# Patient Record
Sex: FEMALE | Race: WHITE | HISPANIC OR LATINO | Employment: OTHER | URBAN - METROPOLITAN AREA
[De-identification: names, ages, dates, MRNs, and addresses within clinical notes are randomized per-mention and may not be internally consistent; named-entity substitution may affect disease eponyms.]

---

## 2022-11-13 ENCOUNTER — HOSPITAL ENCOUNTER (INPATIENT)
Facility: MEDICAL CENTER | Age: 37
LOS: 3 days | DRG: 872 | End: 2022-11-17
Attending: EMERGENCY MEDICINE | Admitting: INTERNAL MEDICINE

## 2022-11-13 ENCOUNTER — OFFICE VISIT (OUTPATIENT)
Dept: URGENT CARE | Facility: PHYSICIAN GROUP | Age: 37
End: 2022-11-13

## 2022-11-13 ENCOUNTER — APPOINTMENT (OUTPATIENT)
Dept: RADIOLOGY | Facility: MEDICAL CENTER | Age: 37
DRG: 872 | End: 2022-11-13
Attending: EMERGENCY MEDICINE

## 2022-11-13 VITALS
OXYGEN SATURATION: 95 % | HEART RATE: 140 BPM | WEIGHT: 154 LBS | BODY MASS INDEX: 28.17 KG/M2 | TEMPERATURE: 103.4 F | DIASTOLIC BLOOD PRESSURE: 82 MMHG | RESPIRATION RATE: 16 BRPM | SYSTOLIC BLOOD PRESSURE: 130 MMHG

## 2022-11-13 DIAGNOSIS — R78.81 BACTEREMIA: ICD-10-CM

## 2022-11-13 DIAGNOSIS — N12 PYELONEPHRITIS: ICD-10-CM

## 2022-11-13 DIAGNOSIS — A41.9 SEPSIS, DUE TO UNSPECIFIED ORGANISM, UNSPECIFIED WHETHER ACUTE ORGAN DYSFUNCTION PRESENT (HCC): ICD-10-CM

## 2022-11-13 DIAGNOSIS — R50.9 FEVER IN ADULT: ICD-10-CM

## 2022-11-13 DIAGNOSIS — N10 ACUTE PYELONEPHRITIS: ICD-10-CM

## 2022-11-13 PROBLEM — R74.8 ELEVATED LIVER ENZYMES: Status: ACTIVE | Noted: 2022-11-13

## 2022-11-13 PROBLEM — D72.829 LEUCOCYTOSIS: Status: ACTIVE | Noted: 2022-11-13

## 2022-11-13 LAB
ALBUMIN SERPL BCP-MCNC: 4.5 G/DL (ref 3.2–4.9)
ALBUMIN/GLOB SERPL: 1.3 G/DL
ALP SERPL-CCNC: 166 U/L (ref 30–99)
ALT SERPL-CCNC: 89 U/L (ref 2–50)
ANION GAP SERPL CALC-SCNC: 13 MMOL/L (ref 7–16)
APPEARANCE UR: CLEAR
APPEARANCE UR: NORMAL
AST SERPL-CCNC: 48 U/L (ref 12–45)
BACTERIA #/AREA URNS HPF: ABNORMAL /HPF
BASOPHILS # BLD AUTO: 0.3 % (ref 0–1.8)
BASOPHILS # BLD: 0.04 K/UL (ref 0–0.12)
BILIRUB SERPL-MCNC: 0.4 MG/DL (ref 0.1–1.5)
BILIRUB UR QL STRIP.AUTO: NEGATIVE
BILIRUB UR STRIP-MCNC: NEGATIVE MG/DL
BUN SERPL-MCNC: 10 MG/DL (ref 8–22)
CALCIUM SERPL-MCNC: 9.3 MG/DL (ref 8.5–10.5)
CHLORIDE SERPL-SCNC: 100 MMOL/L (ref 96–112)
CO2 SERPL-SCNC: 21 MMOL/L (ref 20–33)
COLOR UR AUTO: YELLOW
COLOR UR: YELLOW
CREAT SERPL-MCNC: 0.62 MG/DL (ref 0.5–1.4)
EOSINOPHIL # BLD AUTO: 0 K/UL (ref 0–0.51)
EOSINOPHIL NFR BLD: 0 % (ref 0–6.9)
EPI CELLS #/AREA URNS HPF: ABNORMAL /HPF
ERYTHROCYTE [DISTWIDTH] IN BLOOD BY AUTOMATED COUNT: 39.9 FL (ref 35.9–50)
FLUAV RNA SPEC QL NAA+PROBE: NEGATIVE
FLUBV RNA SPEC QL NAA+PROBE: NEGATIVE
GFR SERPLBLD CREATININE-BSD FMLA CKD-EPI: 116 ML/MIN/1.73 M 2
GLOBULIN SER CALC-MCNC: 3.6 G/DL (ref 1.9–3.5)
GLUCOSE SERPL-MCNC: 126 MG/DL (ref 65–99)
GLUCOSE UR STRIP.AUTO-MCNC: NEGATIVE MG/DL
GLUCOSE UR STRIP.AUTO-MCNC: NEGATIVE MG/DL
HCG SERPL QL: NEGATIVE
HCT VFR BLD AUTO: 41.2 % (ref 37–47)
HGB BLD-MCNC: 13.6 G/DL (ref 12–16)
HYALINE CASTS #/AREA URNS LPF: ABNORMAL /LPF
IMM GRANULOCYTES # BLD AUTO: 0.04 K/UL (ref 0–0.11)
IMM GRANULOCYTES NFR BLD AUTO: 0.3 % (ref 0–0.9)
INT CON NEG: NEGATIVE
INT CON POS: POSITIVE
KETONES UR STRIP.AUTO-MCNC: 40 MG/DL
KETONES UR STRIP.AUTO-MCNC: 40 MG/DL
LACTATE SERPL-SCNC: 1.3 MMOL/L (ref 0.5–2)
LEUKOCYTE ESTERASE UR QL STRIP.AUTO: ABNORMAL
LEUKOCYTE ESTERASE UR QL STRIP.AUTO: NORMAL
LIPASE SERPL-CCNC: 24 U/L (ref 11–82)
LYMPHOCYTES # BLD AUTO: 0.91 K/UL (ref 1–4.8)
LYMPHOCYTES NFR BLD: 7.5 % (ref 22–41)
MCH RBC QN AUTO: 27 PG (ref 27–33)
MCHC RBC AUTO-ENTMCNC: 33 G/DL (ref 33.6–35)
MCV RBC AUTO: 81.9 FL (ref 81.4–97.8)
MICRO URNS: ABNORMAL
MONOCYTES # BLD AUTO: 0.89 K/UL (ref 0–0.85)
MONOCYTES NFR BLD AUTO: 7.4 % (ref 0–13.4)
NEUTROPHILS # BLD AUTO: 10.22 K/UL (ref 2–7.15)
NEUTROPHILS NFR BLD: 84.5 % (ref 44–72)
NITRITE UR QL STRIP.AUTO: NEGATIVE
NITRITE UR QL STRIP.AUTO: POSITIVE
NRBC # BLD AUTO: 0 K/UL
NRBC BLD-RTO: 0 /100 WBC
PH UR STRIP.AUTO: 5.5 [PH] (ref 5–8)
PH UR STRIP.AUTO: 6 [PH] (ref 5–8)
PLATELET # BLD AUTO: 254 K/UL (ref 164–446)
PMV BLD AUTO: 8.6 FL (ref 9–12.9)
POC URINE PREGNANCY TEST: NEGATIVE
POTASSIUM SERPL-SCNC: 3.5 MMOL/L (ref 3.6–5.5)
PROT SERPL-MCNC: 8.1 G/DL (ref 6–8.2)
PROT UR QL STRIP: NEGATIVE MG/DL
PROT UR QL STRIP: NEGATIVE MG/DL
RBC # BLD AUTO: 5.03 M/UL (ref 4.2–5.4)
RBC # URNS HPF: ABNORMAL /HPF
RBC UR QL AUTO: ABNORMAL
RBC UR QL AUTO: NORMAL
RSV RNA SPEC QL NAA+PROBE: NEGATIVE
SARS-COV-2 RNA RESP QL NAA+PROBE: NOTDETECTED
SODIUM SERPL-SCNC: 134 MMOL/L (ref 135–145)
SP GR UR STRIP.AUTO: 1.01
SP GR UR STRIP.AUTO: 1.02
SPECIMEN SOURCE: NORMAL
UROBILINOGEN UR STRIP-MCNC: 1 MG/DL
UROBILINOGEN UR STRIP.AUTO-MCNC: 1 MG/DL
WBC # BLD AUTO: 12.1 K/UL (ref 4.8–10.8)
WBC #/AREA URNS HPF: ABNORMAL /HPF

## 2022-11-13 PROCEDURE — 700105 HCHG RX REV CODE 258: Performed by: INTERNAL MEDICINE

## 2022-11-13 PROCEDURE — 84703 CHORIONIC GONADOTROPIN ASSAY: CPT

## 2022-11-13 PROCEDURE — 83605 ASSAY OF LACTIC ACID: CPT

## 2022-11-13 PROCEDURE — C9803 HOPD COVID-19 SPEC COLLECT: HCPCS | Performed by: EMERGENCY MEDICINE

## 2022-11-13 PROCEDURE — 700102 HCHG RX REV CODE 250 W/ 637 OVERRIDE(OP): Performed by: INTERNAL MEDICINE

## 2022-11-13 PROCEDURE — 83690 ASSAY OF LIPASE: CPT

## 2022-11-13 PROCEDURE — A9270 NON-COVERED ITEM OR SERVICE: HCPCS | Performed by: INTERNAL MEDICINE

## 2022-11-13 PROCEDURE — 99203 OFFICE O/P NEW LOW 30 MIN: CPT | Performed by: NURSE PRACTITIONER

## 2022-11-13 PROCEDURE — 0241U HCHG SARS-COV-2 COVID-19 NFCT DS RESP RNA 4 TRGT MIC: CPT

## 2022-11-13 PROCEDURE — 87077 CULTURE AEROBIC IDENTIFY: CPT

## 2022-11-13 PROCEDURE — 99220 PR INITIAL OBSERVATION CARE,LEVL III: CPT | Performed by: INTERNAL MEDICINE

## 2022-11-13 PROCEDURE — 87186 SC STD MICRODIL/AGAR DIL: CPT

## 2022-11-13 PROCEDURE — 99285 EMERGENCY DEPT VISIT HI MDM: CPT

## 2022-11-13 PROCEDURE — 36415 COLL VENOUS BLD VENIPUNCTURE: CPT

## 2022-11-13 PROCEDURE — 700117 HCHG RX CONTRAST REV CODE 255: Performed by: EMERGENCY MEDICINE

## 2022-11-13 PROCEDURE — 96374 THER/PROPH/DIAG INJ IV PUSH: CPT

## 2022-11-13 PROCEDURE — G0378 HOSPITAL OBSERVATION PER HR: HCPCS

## 2022-11-13 PROCEDURE — 700105 HCHG RX REV CODE 258: Performed by: EMERGENCY MEDICINE

## 2022-11-13 PROCEDURE — 87040 BLOOD CULTURE FOR BACTERIA: CPT

## 2022-11-13 PROCEDURE — 81025 URINE PREGNANCY TEST: CPT | Performed by: NURSE PRACTITIONER

## 2022-11-13 PROCEDURE — 700111 HCHG RX REV CODE 636 W/ 250 OVERRIDE (IP): Performed by: INTERNAL MEDICINE

## 2022-11-13 PROCEDURE — 80053 COMPREHEN METABOLIC PANEL: CPT

## 2022-11-13 PROCEDURE — 85025 COMPLETE CBC W/AUTO DIFF WBC: CPT

## 2022-11-13 PROCEDURE — 99999 PR NO CHARGE: CPT | Performed by: NURSE PRACTITIONER

## 2022-11-13 PROCEDURE — 700111 HCHG RX REV CODE 636 W/ 250 OVERRIDE (IP): Performed by: EMERGENCY MEDICINE

## 2022-11-13 PROCEDURE — 81002 URINALYSIS NONAUTO W/O SCOPE: CPT | Performed by: NURSE PRACTITIONER

## 2022-11-13 PROCEDURE — 87086 URINE CULTURE/COLONY COUNT: CPT

## 2022-11-13 PROCEDURE — 96372 THER/PROPH/DIAG INJ SC/IM: CPT

## 2022-11-13 PROCEDURE — 74177 CT ABD & PELVIS W/CONTRAST: CPT

## 2022-11-13 PROCEDURE — 81001 URINALYSIS AUTO W/SCOPE: CPT

## 2022-11-13 RX ORDER — ENOXAPARIN SODIUM 100 MG/ML
40 INJECTION SUBCUTANEOUS DAILY
Status: DISCONTINUED | OUTPATIENT
Start: 2022-11-13 | End: 2022-11-17 | Stop reason: HOSPADM

## 2022-11-13 RX ORDER — POLYETHYLENE GLYCOL 3350 17 G/17G
1 POWDER, FOR SOLUTION ORAL
Status: DISCONTINUED | OUTPATIENT
Start: 2022-11-13 | End: 2022-11-17 | Stop reason: HOSPADM

## 2022-11-13 RX ORDER — PNV NO.95/FERROUS FUM/FOLIC AC 28MG-0.8MG
1 TABLET ORAL DAILY
COMMUNITY
End: 2023-02-15

## 2022-11-13 RX ORDER — BISACODYL 10 MG
10 SUPPOSITORY, RECTAL RECTAL
Status: DISCONTINUED | OUTPATIENT
Start: 2022-11-13 | End: 2022-11-17 | Stop reason: HOSPADM

## 2022-11-13 RX ORDER — ACETAMINOPHEN 500 MG
1000 TABLET ORAL ONCE
Status: COMPLETED | OUTPATIENT
Start: 2022-11-13 | End: 2022-11-13

## 2022-11-13 RX ORDER — PROCHLORPERAZINE EDISYLATE 5 MG/ML
5-10 INJECTION INTRAMUSCULAR; INTRAVENOUS EVERY 4 HOURS PRN
Status: DISCONTINUED | OUTPATIENT
Start: 2022-11-13 | End: 2022-11-17 | Stop reason: HOSPADM

## 2022-11-13 RX ORDER — LEVOTHYROXINE SODIUM 0.03 MG/1
25 TABLET ORAL
COMMUNITY
End: 2024-01-19 | Stop reason: DRUGHIGH

## 2022-11-13 RX ORDER — ONDANSETRON 2 MG/ML
4 INJECTION INTRAMUSCULAR; INTRAVENOUS EVERY 4 HOURS PRN
Status: DISCONTINUED | OUTPATIENT
Start: 2022-11-13 | End: 2022-11-17 | Stop reason: HOSPADM

## 2022-11-13 RX ORDER — OXYCODONE HYDROCHLORIDE 5 MG/1
2.5 TABLET ORAL
Status: DISCONTINUED | OUTPATIENT
Start: 2022-11-13 | End: 2022-11-17 | Stop reason: HOSPADM

## 2022-11-13 RX ORDER — MORPHINE SULFATE 4 MG/ML
2 INJECTION INTRAVENOUS
Status: DISCONTINUED | OUTPATIENT
Start: 2022-11-13 | End: 2022-11-17 | Stop reason: HOSPADM

## 2022-11-13 RX ORDER — SODIUM CHLORIDE, SODIUM LACTATE, POTASSIUM CHLORIDE, AND CALCIUM CHLORIDE .6; .31; .03; .02 G/100ML; G/100ML; G/100ML; G/100ML
30 INJECTION, SOLUTION INTRAVENOUS ONCE
Status: DISCONTINUED | OUTPATIENT
Start: 2022-11-13 | End: 2022-11-13

## 2022-11-13 RX ORDER — AMOXICILLIN 250 MG
2 CAPSULE ORAL 2 TIMES DAILY
Status: DISCONTINUED | OUTPATIENT
Start: 2022-11-13 | End: 2022-11-17 | Stop reason: HOSPADM

## 2022-11-13 RX ORDER — LEVOTHYROXINE SODIUM 0.03 MG/1
25 TABLET ORAL
Status: DISCONTINUED | OUTPATIENT
Start: 2022-11-14 | End: 2022-11-17 | Stop reason: HOSPADM

## 2022-11-13 RX ORDER — SODIUM CHLORIDE, SODIUM LACTATE, POTASSIUM CHLORIDE, AND CALCIUM CHLORIDE .6; .31; .03; .02 G/100ML; G/100ML; G/100ML; G/100ML
30 INJECTION, SOLUTION INTRAVENOUS ONCE
Status: COMPLETED | OUTPATIENT
Start: 2022-11-13 | End: 2022-11-13

## 2022-11-13 RX ORDER — PROMETHAZINE HYDROCHLORIDE 25 MG/1
12.5-25 SUPPOSITORY RECTAL EVERY 4 HOURS PRN
Status: DISCONTINUED | OUTPATIENT
Start: 2022-11-13 | End: 2022-11-17 | Stop reason: HOSPADM

## 2022-11-13 RX ORDER — OXYCODONE HYDROCHLORIDE 5 MG/1
5 TABLET ORAL
Status: DISCONTINUED | OUTPATIENT
Start: 2022-11-13 | End: 2022-11-17 | Stop reason: HOSPADM

## 2022-11-13 RX ORDER — PROMETHAZINE HYDROCHLORIDE 25 MG/1
12.5-25 TABLET ORAL EVERY 4 HOURS PRN
Status: DISCONTINUED | OUTPATIENT
Start: 2022-11-13 | End: 2022-11-17 | Stop reason: HOSPADM

## 2022-11-13 RX ORDER — CEFTRIAXONE 1 G/1
1000 INJECTION, POWDER, FOR SOLUTION INTRAMUSCULAR; INTRAVENOUS ONCE
Status: COMPLETED | OUTPATIENT
Start: 2022-11-13 | End: 2022-11-13

## 2022-11-13 RX ORDER — ACETAMINOPHEN 325 MG/1
650 TABLET ORAL EVERY 6 HOURS PRN
Status: DISCONTINUED | OUTPATIENT
Start: 2022-11-13 | End: 2022-11-17 | Stop reason: HOSPADM

## 2022-11-13 RX ORDER — ONDANSETRON 4 MG/1
4 TABLET, ORALLY DISINTEGRATING ORAL EVERY 4 HOURS PRN
Status: DISCONTINUED | OUTPATIENT
Start: 2022-11-13 | End: 2022-11-17 | Stop reason: HOSPADM

## 2022-11-13 RX ORDER — SODIUM CHLORIDE, SODIUM LACTATE, POTASSIUM CHLORIDE, CALCIUM CHLORIDE 600; 310; 30; 20 MG/100ML; MG/100ML; MG/100ML; MG/100ML
INJECTION, SOLUTION INTRAVENOUS CONTINUOUS
Status: DISCONTINUED | OUTPATIENT
Start: 2022-11-13 | End: 2022-11-15

## 2022-11-13 RX ADMIN — ACETAMINOPHEN 650 MG: 325 TABLET, FILM COATED ORAL at 21:46

## 2022-11-13 RX ADMIN — CEFTRIAXONE SODIUM 1000 MG: 1 INJECTION, POWDER, FOR SOLUTION INTRAMUSCULAR; INTRAVENOUS at 20:00

## 2022-11-13 RX ADMIN — SODIUM CHLORIDE, POTASSIUM CHLORIDE, SODIUM LACTATE AND CALCIUM CHLORIDE: 600; 310; 30; 20 INJECTION, SOLUTION INTRAVENOUS at 20:45

## 2022-11-13 RX ADMIN — ENOXAPARIN SODIUM 40 MG: 40 INJECTION SUBCUTANEOUS at 20:39

## 2022-11-13 RX ADMIN — Medication 1000 MG: at 16:18

## 2022-11-13 RX ADMIN — IOHEXOL 100 ML: 350 INJECTION, SOLUTION INTRAVENOUS at 18:42

## 2022-11-13 RX ADMIN — SODIUM CHLORIDE, POTASSIUM CHLORIDE, SODIUM LACTATE AND CALCIUM CHLORIDE 2166 ML: 600; 310; 30; 20 INJECTION, SOLUTION INTRAVENOUS at 17:57

## 2022-11-13 RX ADMIN — OXYCODONE 5 MG: 5 TABLET ORAL at 20:39

## 2022-11-13 ASSESSMENT — ENCOUNTER SYMPTOMS
EYE PAIN: 0
NECK PAIN: 0
FOCAL WEAKNESS: 0
CHILLS: 0
HEADACHES: 0
ORTHOPNEA: 0
NAUSEA: 0
CLAUDICATION: 0
HEARTBURN: 0
DIZZINESS: 0
EYE REDNESS: 0
WHEEZING: 0
PALPITATIONS: 0
INSOMNIA: 0
DEPRESSION: 0
SINUS PAIN: 0
WEIGHT LOSS: 0
DIARRHEA: 0
EYE DISCHARGE: 0
SORE THROAT: 0
FLANK PAIN: 1
SEIZURES: 0
BLURRED VISION: 0
MYALGIAS: 0
COUGH: 0
SHORTNESS OF BREATH: 0
NERVOUS/ANXIOUS: 0
CONSTIPATION: 0
STRIDOR: 0
SPUTUM PRODUCTION: 0
ABDOMINAL PAIN: 0
BACK PAIN: 0
BLOOD IN STOOL: 0
VOMITING: 0
FEVER: 0

## 2022-11-13 ASSESSMENT — FIBROSIS 4 INDEX: FIB4 SCORE: 0.76

## 2022-11-13 ASSESSMENT — PATIENT HEALTH QUESTIONNAIRE - PHQ9
1. LITTLE INTEREST OR PLEASURE IN DOING THINGS: NOT AT ALL
2. FEELING DOWN, DEPRESSED, IRRITABLE, OR HOPELESS: NOT AT ALL
SUM OF ALL RESPONSES TO PHQ9 QUESTIONS 1 AND 2: 0

## 2022-11-13 ASSESSMENT — LIFESTYLE VARIABLES
CONSUMPTION TOTAL: NEGATIVE
EVER HAD A DRINK FIRST THING IN THE MORNING TO STEADY YOUR NERVES TO GET RID OF A HANGOVER: NO
ON A TYPICAL DAY WHEN YOU DRINK ALCOHOL HOW MANY DRINKS DO YOU HAVE: 0
TOTAL SCORE: 0
HAVE PEOPLE ANNOYED YOU BY CRITICIZING YOUR DRINKING: NO
AVERAGE NUMBER OF DAYS PER WEEK YOU HAVE A DRINK CONTAINING ALCOHOL: 0
TOTAL SCORE: 0
ALCOHOL_USE: NO
HOW MANY TIMES IN THE PAST YEAR HAVE YOU HAD 5 OR MORE DRINKS IN A DAY: 0
EVER FELT BAD OR GUILTY ABOUT YOUR DRINKING: NO
TOTAL SCORE: 0
HAVE YOU EVER FELT YOU SHOULD CUT DOWN ON YOUR DRINKING: NO

## 2022-11-14 PROBLEM — R78.81 BACTEREMIA: Status: ACTIVE | Noted: 2022-11-14

## 2022-11-14 LAB
ALBUMIN SERPL BCP-MCNC: 3.6 G/DL (ref 3.2–4.9)
ALBUMIN/GLOB SERPL: 1 G/DL
ALP SERPL-CCNC: 142 U/L (ref 30–99)
ALT SERPL-CCNC: 64 U/L (ref 2–50)
ANION GAP SERPL CALC-SCNC: 9 MMOL/L (ref 7–16)
AST SERPL-CCNC: 31 U/L (ref 12–45)
BILIRUB SERPL-MCNC: 0.6 MG/DL (ref 0.1–1.5)
BUN SERPL-MCNC: 7 MG/DL (ref 8–22)
CALCIUM SERPL-MCNC: 8.9 MG/DL (ref 8.5–10.5)
CHLORIDE SERPL-SCNC: 102 MMOL/L (ref 96–112)
CO2 SERPL-SCNC: 27 MMOL/L (ref 20–33)
CREAT SERPL-MCNC: 0.54 MG/DL (ref 0.5–1.4)
ERYTHROCYTE [DISTWIDTH] IN BLOOD BY AUTOMATED COUNT: 42.5 FL (ref 35.9–50)
GFR SERPLBLD CREATININE-BSD FMLA CKD-EPI: 120 ML/MIN/1.73 M 2
GLOBULIN SER CALC-MCNC: 3.6 G/DL (ref 1.9–3.5)
GLUCOSE SERPL-MCNC: 99 MG/DL (ref 65–99)
HCT VFR BLD AUTO: 37.9 % (ref 37–47)
HGB BLD-MCNC: 12.6 G/DL (ref 12–16)
INR PPP: 1.06 (ref 0.87–1.13)
MCH RBC QN AUTO: 28.1 PG (ref 27–33)
MCHC RBC AUTO-ENTMCNC: 33.2 G/DL (ref 33.6–35)
MCV RBC AUTO: 84.4 FL (ref 81.4–97.8)
PLATELET # BLD AUTO: 227 K/UL (ref 164–446)
PMV BLD AUTO: 9.2 FL (ref 9–12.9)
POTASSIUM SERPL-SCNC: 3.4 MMOL/L (ref 3.6–5.5)
PROT SERPL-MCNC: 7.2 G/DL (ref 6–8.2)
PROTHROMBIN TIME: 13.7 SEC (ref 12–14.6)
RBC # BLD AUTO: 4.49 M/UL (ref 4.2–5.4)
SODIUM SERPL-SCNC: 138 MMOL/L (ref 135–145)
WBC # BLD AUTO: 9.4 K/UL (ref 4.8–10.8)

## 2022-11-14 PROCEDURE — 99232 SBSQ HOSP IP/OBS MODERATE 35: CPT | Performed by: NURSE PRACTITIONER

## 2022-11-14 PROCEDURE — 85610 PROTHROMBIN TIME: CPT

## 2022-11-14 PROCEDURE — 700111 HCHG RX REV CODE 636 W/ 250 OVERRIDE (IP): Performed by: INTERNAL MEDICINE

## 2022-11-14 PROCEDURE — 770001 HCHG ROOM/CARE - MED/SURG/GYN PRIV*

## 2022-11-14 PROCEDURE — 85027 COMPLETE CBC AUTOMATED: CPT

## 2022-11-14 PROCEDURE — 700105 HCHG RX REV CODE 258: Performed by: INTERNAL MEDICINE

## 2022-11-14 PROCEDURE — A9270 NON-COVERED ITEM OR SERVICE: HCPCS | Performed by: INTERNAL MEDICINE

## 2022-11-14 PROCEDURE — 80053 COMPREHEN METABOLIC PANEL: CPT

## 2022-11-14 PROCEDURE — 700102 HCHG RX REV CODE 250 W/ 637 OVERRIDE(OP): Performed by: INTERNAL MEDICINE

## 2022-11-14 PROCEDURE — 36415 COLL VENOUS BLD VENIPUNCTURE: CPT

## 2022-11-14 RX ADMIN — CEFTRIAXONE SODIUM 1000 MG: 10 INJECTION, POWDER, FOR SOLUTION INTRAVENOUS at 21:38

## 2022-11-14 RX ADMIN — SODIUM CHLORIDE, POTASSIUM CHLORIDE, SODIUM LACTATE AND CALCIUM CHLORIDE: 600; 310; 30; 20 INJECTION, SOLUTION INTRAVENOUS at 10:18

## 2022-11-14 RX ADMIN — LEVOTHYROXINE SODIUM 25 MCG: 0.03 TABLET ORAL at 05:29

## 2022-11-14 RX ADMIN — SODIUM CHLORIDE, POTASSIUM CHLORIDE, SODIUM LACTATE AND CALCIUM CHLORIDE: 600; 310; 30; 20 INJECTION, SOLUTION INTRAVENOUS at 17:40

## 2022-11-14 RX ADMIN — ACETAMINOPHEN 650 MG: 325 TABLET, FILM COATED ORAL at 17:05

## 2022-11-14 RX ADMIN — ACETAMINOPHEN 650 MG: 325 TABLET, FILM COATED ORAL at 07:49

## 2022-11-14 RX ADMIN — ENOXAPARIN SODIUM 40 MG: 40 INJECTION SUBCUTANEOUS at 17:41

## 2022-11-14 ASSESSMENT — ENCOUNTER SYMPTOMS
RESPIRATORY NEGATIVE: 1
PSYCHIATRIC NEGATIVE: 1
EYES NEGATIVE: 1
CARDIOVASCULAR NEGATIVE: 1
WEAKNESS: 1
FLANK PAIN: 1
GASTROINTESTINAL NEGATIVE: 1
FEVER: 1
MYALGIAS: 1
CHILLS: 1

## 2022-11-14 NOTE — ED PROVIDER NOTES
ED Provider Note    Scribed for Bernie Blevins M.D. by Sera Healy. 11/13/2022, 5:47 PM.    Primary care provider: Pcp Pt States None  Means of arrival: Walk-in  History obtained from: Patient  History limited by: None    CHIEF COMPLAINT  Chief Complaint   Patient presents with    Flank Pain     Bilateral flanks for 3 weeks.  C/o dysuria, frequency.  Sent here by  for eval       HPI  Dulce Chow is a 37 y.o. female who presents to the Emergency Department for evaluation of flank pain onset 3 weeks ago.  At first it started as left flank pain with some associated nausea but then moved to the right.  Now she is complaining of feeling like she has menstruation cramps even though she is not on her menstrual period.  She reports dysuria and increased urinary frequency.  She has been having fever, chills rigors.  She has a very distant history of kidney infection like 20 years ago.  She has had some nausea without vomiting.  She was seen at an urgent care but was advised to come to the ED for further evaluation.     REVIEW OF SYSTEMS  Pertinent positives include flank pain, urinary frequency, nausea, fever, chills rigors cramping, and dysuria. Pertinent negatives include no vomiting.  All other systems reviewed and negative.    PAST MEDICAL HISTORY   None noted    SURGICAL HISTORY  patient denies any surgical history    SOCIAL HISTORY  None noted  Social History     Tobacco Use    Smoking status: Never    Smokeless tobacco: Never   Vaping Use    Vaping Use: Never used   Substance Use Topics    Alcohol use: Never    Drug use: Never        FAMILY HISTORY  None noted    CURRENT MEDICATIONS  Home Medications       Reviewed by Lisa Queen R.N. (Registered Nurse) on 11/13/22 at 2048  Med List Status: Complete     Medication Last Dose Status   levothyroxine (SYNTHROID) 25 MCG Tab 11/11/2022 Active   Prenatal Vit-Fe Fumarate-FA (PRENATAL VITAMINS) 28-0.8 MG Tab 11/11/2022 Active                    ALLERGIES  No  Known Allergies    PHYSICAL EXAM  VITAL SIGNS: BP (!) 136/99   Pulse (!) 141   Temp 37.8 °C (100 °F) (Tympanic)   Resp 18   Wt 72.2 kg (159 lb 2.8 oz)   SpO2 96%   BMI 29.11 kg/m²   Constitutional: Alert in no apparent distress.  HENT: No signs of trauma, Bilateral external ears normal, Nose normal.   Eyes: Pupils are equal and reactive, Conjunctiva normal, Non-icteric.   Neck: Normal range of motion, No tenderness, Supple, No stridor.   Cardiovascular: Tachycardic, no murmurs.   Thorax & Lungs: Normal breath sounds, No respiratory distress, No wheezing, No chest tenderness.   Abdomen: Bowel sounds normal, Soft, slight right lower quadrant tenderness, No masses, No peritoneal signs.  Skin: Warm, Dry, No erythema, No rash.   Back: No bony tenderness, minimal right CVA tenderness.   Musculoskeletal:  No major deformities noted.   Neurologic: Alert, moving all extremities without difficulty, no focal deficits.    LABS  Labs Reviewed   CBC WITH DIFFERENTIAL - Abnormal; Notable for the following components:       Result Value    WBC 12.1 (*)     MCHC 33.0 (*)     MPV 8.6 (*)     Neutrophils-Polys 84.50 (*)     Lymphocytes 7.50 (*)     Neutrophils (Absolute) 10.22 (*)     Lymphs (Absolute) 0.91 (*)     Monos (Absolute) 0.89 (*)     All other components within normal limits   COMP METABOLIC PANEL - Abnormal; Notable for the following components:    Sodium 134 (*)     Potassium 3.5 (*)     Glucose 126 (*)     AST(SGOT) 48 (*)     ALT(SGPT) 89 (*)     Alkaline Phosphatase 166 (*)     Globulin 3.6 (*)     All other components within normal limits   URINALYSIS,CULTURE IF INDICATED - Abnormal; Notable for the following components:    Ketones 40 (*)     Leukocyte Esterase Small (*)     Occult Blood Large (*)     All other components within normal limits    Narrative:     Indication for culture:->Patient WITHOUT an indwelling Chavarria  catheter in place with new onset of Dysuria, Frequency,  Urgency, and/or Suprapubic pain  "  URINE MICROSCOPIC (W/UA) - Abnormal; Notable for the following components:    WBC 10-20 (*)     RBC 5-10 (*)     Bacteria Moderate (*)     Epithelial Cells Moderate (*)     All other components within normal limits    Narrative:     Indication for culture:->Patient WITHOUT an indwelling Chavarria  catheter in place with new onset of Dysuria, Frequency,  Urgency, and/or Suprapubic pain   LIPASE   HCG QUAL SERUM   ESTIMATED GFR   LACTIC ACID   COV-2, FLU A/B, AND RSV BY PCR ([employer])   URINE CULTURE(NEW)    Narrative:     Indication for culture:->Patient WITHOUT an indwelling Chavarria  catheter in place with new onset of Dysuria, Frequency,  Urgency, and/or Suprapubic pain   BLOOD CULTURE    Narrative:     Per Hospital Policy: Only change Specimen Src: to \"Line\" if  specified by physician order.   BLOOD CULTURE    Narrative:     From different peripheral sites, if not done within the last  24 hours (Per Hospital Policy: Only change specimen source to  \"Line\" if specified by physician order)   PROTHROMBIN TIME     All labs reviewed by me.    RADIOLOGY  CT-ABDOMEN-PELVIS WITH   Final Result      1.  Small ill-defined area of hypodensity in the right superior pole kidney. This can be seen with changes of pyelonephritis.   2.  No hydronephrosis and no obstructive ureteral stones identified.   3.  Bladder is unremarkable with no CT evidence of cystitis.        The radiologist's interpretation of all radiological studies have been reviewed by me.    COURSE & MEDICAL DECISION MAKING  Pertinent Labs & Imaging studies reviewed. (See chart for details)    Differential diagnoses include but are not limited to: Pyelonephritis, kidney infection    5:47 PM - Patient seen and examined at bedside. Patient will be treated with  Rocephin 1,000 mcg and LR infusion. Ordered CT-abdomen, blood culture, CoV-2, FLU A/B, and RSV by PCR, lactic acid, urine microscopic w. UA, eGFr, CBC w/ diff, CMP,  Lipase, HCG qual and UA w/ culture if indicated " to evaluate her symptoms.     7:22 PM  - Paged Hosipitalist.    7:34 PM - I discussed the patient's case and the above findings with Dr. Bowens (Hospitalist) who will accept the patient for admission.  Patient's care was transferred at this time.    7:40 PM - Patient was reevaluated at bedside. Discussed lab and radiology results with the patient and informed them of the plan to admit. The patient is agreeable to the plan of care.        HYDRATION: Based on the patient's presentation of Sepsis the patient was given IV fluids. IV Hydration was used because oral hydration was not adequate alone. Upon recheck following hydration, the patient was improved.    Decision Making:  This is a 37 y.o. year old female who presents with tachycardia and chills.  On exam patient was quite tachycardic she was given fluids for presumed sepsis.  CBC shows a slightly elevated white count.  Urinalysis showed 10-20 white cells with moderate bacteria.  Her LFTs are also slightly abnormal.  CT scan shows a small hypodensity in the right superior pole of the kidney.  I do think patient has pyelonephritis there is no evidence of hydronephrosis.  Given her presenting symptoms and tachycardia with fever I was concerned for sepsis.  Blood cultures were obtained.  I do think she requires hospitalization given concern for sepsis.  I spoke with Dr. Bowens who is agreeable to consult hospitalization.      DISPOSITION:  Patient will be hospitalized by Dr. Bowens in guarded condition.    FINAL IMPRESSION  1. Pyelonephritis    2. Sepsis, due to unspecified organism, unspecified whether acute organ dysfunction present (HCC)         This dictation has been created using voice recognition software and/or scribes. The accuracy of the dictation is limited by the abilities of the software and the expertise of the scribes. I expect there may be some errors of grammar and possibly content. I made every attempt to manually correct the errors within my dictation.  However, errors related to voice recognition software and/or scribes may still exist and should be interpreted within the appropriate context.     I, Sera Healy (Scribe), am scribing for, and in the presence of, Bernie Blevins M.D..    Electronically signed by: Sera Healy (Scribe), 11/13/2022    IBernie M.D. personally performed the services described in this documentation, as scribed by Sera Healy in my presence, and it is both accurate and complete.    The note accurately reflects work and decisions made by me.  Bernie Blevins M.D.  11/13/2022  10:21 PM

## 2022-11-14 NOTE — ED NOTES
Agree with triage  Lactic acid drawn and sent to lab  Bolus #1, type: LR.  Started at:  1805, per Sepsis Protocol and/or MD order.

## 2022-11-14 NOTE — ED NOTES
Pt to T214 via transport. Pt with all belongings in possession. Pt is medical and on room air. Report has been given to Lisa SON.

## 2022-11-14 NOTE — H&P
Hospital Medicine History & Physical Note    Date of Service  11/13/2022    Primary Care Physician  Pcp Pt States None    Consultants      Specialist Names:     Code Status  Full Code    Chief Complaint  Chief Complaint   Patient presents with    Flank Pain     Bilateral flanks for 3 weeks.  C/o dysuria, frequency.  Sent here by  for eval       History of Presenting Illness  Dulce Chow is a 37 y.o. female with past medical history of hypothyroid who presented 11/13/2022 with bilateral flank pain for the past 3 weeks.  She stated that it started out with left-sided flank pain and later on moved to right-sided flank area.  She described the pain as sharp pain, radiated to the front, intermittent in nature, 5 out of 10 intensity.  Associated with dysuria and foul-smelling urine.  He did not go to see any doctor during that time.  Finally she decided to come to ER and she was found to be sepsis related to pyelonephritis.  Sepsis protocol was initiated and IV antibiotic was given.  CT scan of the abdomen done and showed no acute finding.  She will be admitted for further management.    I discussed the plan of care with patient.    Review of Systems  Review of Systems   Constitutional:  Negative for chills, fever and weight loss.   HENT:  Negative for congestion, hearing loss, nosebleeds, sinus pain and sore throat.    Eyes:  Negative for blurred vision, pain, discharge and redness.   Respiratory:  Negative for cough, sputum production, shortness of breath, wheezing and stridor.    Cardiovascular:  Negative for chest pain, palpitations, orthopnea and claudication.   Gastrointestinal:  Negative for abdominal pain, blood in stool, constipation, diarrhea, heartburn, nausea and vomiting.   Genitourinary:  Positive for flank pain. Negative for dysuria, frequency, hematuria and urgency.   Musculoskeletal:  Negative for back pain, myalgias and neck pain.   Skin:  Negative for itching and rash.   Neurological:  Negative for  dizziness, focal weakness, seizures and headaches.   Psychiatric/Behavioral:  Negative for depression. The patient is not nervous/anxious and does not have insomnia.      Past Medical History  hypothyroid    Surgical History  Reviewed and no pertinent PSH     Family History     Family history reviewed with patient. There is no family history that is pertinent to the chief complaint.     Social History       Allergies  No Known Allergies    Medications  Prior to Admission Medications   Prescriptions Last Dose Informant Patient Reported? Taking?   Prenatal Vit-Fe Fumarate-FA (PRENATAL VITAMINS) 28-0.8 MG Tab   Yes No   Sig: Take 1 Tablet by mouth every day. WITH FOOD   levothyroxine (SYNTHROID) 25 MCG Tab   Yes No   Sig: TAKE 1 TABLET BY MOUTH IN THE MORNING ON AN EMPTY STOMACH FOR THYROID. REPEAT LABS IN 4-6 WEEKS      Facility-Administered Medications: None       Physical Exam  Temp:  [36.7 °C (98.1 °F)-39.7 °C (103.4 °F)] 36.7 °C (98.1 °F)  Pulse:  [] 98  Resp:  [16-18] 16  BP: (113-136)/(63-99) 113/63  SpO2:  [95 %-97 %] 97 %  Blood Pressure: 113/63   Temperature: 36.7 °C (98.1 °F)   Pulse: 98   Respiration: 16   Pulse Oximetry: 97 %       Physical Exam  Vitals reviewed.   Constitutional:       General: She is not in acute distress.     Appearance: Normal appearance. She is normal weight. She is not ill-appearing, toxic-appearing or diaphoretic.   HENT:      Head: Normocephalic and atraumatic.      Right Ear: Tympanic membrane, ear canal and external ear normal.      Left Ear: Tympanic membrane, ear canal and external ear normal.      Nose: No congestion or rhinorrhea.   Eyes:      Extraocular Movements: Extraocular movements intact.      Conjunctiva/sclera: Conjunctivae normal.      Pupils: Pupils are equal, round, and reactive to light.   Cardiovascular:      Rate and Rhythm: Regular rhythm. Tachycardia present.      Pulses: Normal pulses.      Heart sounds: Normal heart sounds. No murmur heard.    No  friction rub. No gallop.   Pulmonary:      Effort: Pulmonary effort is normal. No respiratory distress.      Breath sounds: Normal breath sounds. No stridor. No wheezing or rhonchi.   Abdominal:      General: Bowel sounds are normal. There is no distension.      Palpations: Abdomen is soft. There is no mass.      Tenderness: There is no abdominal tenderness. There is no guarding or rebound.      Hernia: No hernia is present.   Musculoskeletal:         General: No swelling or tenderness.      Cervical back: Normal range of motion and neck supple.   Skin:     General: Skin is warm.      Findings: No erythema.   Neurological:      General: No focal deficit present.      Mental Status: She is alert and oriented to person, place, and time.       Laboratory:  Recent Labs     11/13/22  1654   WBC 12.1*   RBC 5.03   HEMOGLOBIN 13.6   HEMATOCRIT 41.2   MCV 81.9   MCH 27.0   MCHC 33.0*   RDW 39.9   PLATELETCT 254   MPV 8.6*     Recent Labs     11/13/22  1654   SODIUM 134*   POTASSIUM 3.5*   CHLORIDE 100   CO2 21   GLUCOSE 126*   BUN 10   CREATININE 0.62   CALCIUM 9.3     Recent Labs     11/13/22  1654   ALTSGPT 89*   ASTSGOT 48*   ALKPHOSPHAT 166*   TBILIRUBIN 0.4   LIPASE 24   GLUCOSE 126*         No results for input(s): NTPROBNP in the last 72 hours.      No results for input(s): TROPONINT in the last 72 hours.    Imaging:  CT-ABDOMEN-PELVIS WITH   Final Result      1.  Small ill-defined area of hypodensity in the right superior pole kidney. This can be seen with changes of pyelonephritis.   2.  No hydronephrosis and no obstructive ureteral stones identified.   3.  Bladder is unremarkable with no CT evidence of cystitis.               Assessment/Plan:  Justification for Admission Status  I anticipate this patient is appropriate for observation status at this time because sepsis    Patient will need a Med/Surg bed on MEDICAL service .  The need is secondary to sepsis related to pyelonephritis.    * Sepsis (HCC)- (present on  admission)  Assessment & Plan  This is Sepsis Present on admission  SIRS criteria identified on my evaluation include: Fever, with temperature greater than 101 deg F, Tachycardia, with heart rate greater than 90 BPM and Leukocytosis, with WBC greater than 12,000  Source is pyelonephritis  Sepsis protocol initiated  Fluid resuscitation ordered per protocol  Crystalloid Fluid Administration: Fluid resuscitation ordered per standard protocol - 30 mL/kg per current or ideal body weight  IV antibiotics as appropriate for source of sepsis  Reassessment: I have reassessed the patient's hemodynamic status          Elevated liver enzymes- (present on admission)  Assessment & Plan  Likely related to fatty liver  Follow cmp    Leucocytosis- (present on admission)  Assessment & Plan  Related to pyelo    Pyelonephritis- (present on admission)  Assessment & Plan  On IV ceftriaxone  Follow culture      VTE prophylaxis: heparin ppx

## 2022-11-14 NOTE — PROGRESS NOTES
Hospital Medicine Daily Progress Note    Date of Service  11/14/2022    Chief Complaint  Dulce Chow is a 37 y.o. female admitted 11/13/2022 with B/L flank pain    Hospital Course  Ms. Dulce Eric is a 37 y.o. female [place] speaking female with past medical history of hypothyroid who presented 11/13/2022 with bilateral flank pain for the past 3 weeks.      She stated that it started out with left-sided flank pain and later on moved to right-sided flank area.  She described the pain as sharp pain, radiated to the front, intermittent in nature, 5 out of 10 intensity.  Associated with dysuria and foul-smelling urine.  He did not go to see any doctor during that time. Finally she decided to come to ER     In ER, patient noted to have within normal limits vital signs except for elevated temperature at 39.7 and tachycardic at 140.  She was found to be sepsis related to pyelonephritis.  Sepsis protocol was initiated and IV antibiotic was given.  CT scan of the abdomen done and showed no acute finding.  She will be admitted for further management.    Patient monitored overnight.  On assessment the next day, patient is reporting subjective fever and chills.  Blood cultures also noted to be positive with gram-negative rods.  Patient currently on IV ABX, Rocephin for coverage.  Patient will be switched to inpatient status as she is anticipated to stay 2-3 midnights for management of bacteremia and pyelonephritis.    Interval Problem Update  -Patient seen and examined.  Patient still notes bilateral flank pain.  Patient endorses subjective fever and feeling chills.  Discussed with patient blood culture results and plan of care.  At this time, patient is aware in plan of care.  -Plan of care: Pain management; continue IV ABX Rocephin; follow cultures  -Disposition: Patient switched to inpatient status as she is anticipated to stay 3-3 midnights for management of bacteremia and pyelonephritis  -Lab work: Reviewed;  expected  -VSS at this time    I have discussed this patient's plan of care and discharge plan at IDT rounds today with Case Management, Nursing, Nursing leadership, and other members of the IDT team.    Consultants/Specialty  NONE    Code Status  Full Code    Disposition  Patient is not medically cleared for discharge.   Anticipate discharge to to home with close outpatient follow-up.  I have placed the appropriate orders for post-discharge needs.    Review of Systems  Review of Systems   Constitutional:  Positive for chills, fever and malaise/fatigue.   HENT: Negative.     Eyes: Negative.    Respiratory: Negative.     Cardiovascular: Negative.    Gastrointestinal: Negative.    Genitourinary:  Positive for dysuria, flank pain and urgency.   Musculoskeletal:  Positive for myalgias.   Skin: Negative.    Neurological:  Positive for weakness.   Endo/Heme/Allergies: Negative.    Psychiatric/Behavioral: Negative.        Physical Exam  Temp:  [36.5 °C (97.7 °F)-39.7 °C (103.4 °F)] 37.8 °C (100 °F)  Pulse:  [] 102  Resp:  [16-20] 16  BP: (113-149)/(63-99) 133/87  SpO2:  [94 %-98 %] 96 %    Physical Exam  Vitals and nursing note reviewed.   HENT:      Head: Normocephalic.      Nose: Nose normal.   Eyes:      Pupils: Pupils are equal, round, and reactive to light.   Cardiovascular:      Rate and Rhythm: Regular rhythm. Tachycardia present.      Pulses: Normal pulses.      Heart sounds: Normal heart sounds.   Pulmonary:      Effort: Pulmonary effort is normal.      Breath sounds: Normal breath sounds.   Abdominal:      General: Bowel sounds are normal.      Palpations: Abdomen is soft.   Musculoskeletal:         General: Tenderness present.      Cervical back: Normal range of motion and neck supple.   Skin:     General: Skin is dry.      Capillary Refill: Capillary refill takes 2 to 3 seconds.   Neurological:      Mental Status: She is alert. Mental status is at baseline.      Motor: Weakness present.       Fluids  No  intake or output data in the 24 hours ending 11/14/22 1433    Laboratory  Recent Labs     11/13/22  1654 11/14/22  0658   WBC 12.1* 9.4   RBC 5.03 4.49   HEMOGLOBIN 13.6 12.6   HEMATOCRIT 41.2 37.9   MCV 81.9 84.4   MCH 27.0 28.1   MCHC 33.0* 33.2*   RDW 39.9 42.5   PLATELETCT 254 227   MPV 8.6* 9.2     Recent Labs     11/13/22  1654 11/14/22  0658   SODIUM 134* 138   POTASSIUM 3.5* 3.4*   CHLORIDE 100 102   CO2 21 27   GLUCOSE 126* 99   BUN 10 7*   CREATININE 0.62 0.54   CALCIUM 9.3 8.9     Recent Labs     11/14/22  0658   INR 1.06               Imaging  CT-ABDOMEN-PELVIS WITH   Final Result      1.  Small ill-defined area of hypodensity in the right superior pole kidney. This can be seen with changes of pyelonephritis.   2.  No hydronephrosis and no obstructive ureteral stones identified.   3.  Bladder is unremarkable with no CT evidence of cystitis.           Assessment/Plan  * Sepsis (HCC)- (present on admission)  Assessment & Plan  This is Sepsis Present on admission  SIRS criteria identified on my evaluation include: Fever, with temperature greater than 101 deg F, Tachycardia, with heart rate greater than 90 BPM and Leukocytosis, with WBC greater than 12,000  Source is pyelonephritis  Sepsis protocol initiated  Fluid resuscitation ordered per protocol  Crystalloid Fluid Administration: Fluid resuscitation ordered per standard protocol - 30 mL/kg per current or ideal body weight  IV antibiotics as appropriate for source of sepsis  Reassessment: I have reassessed the patient's hemodynamic status          Bacteremia- (present on admission)  Assessment & Plan  - Noted positive blood culture with gram-negative rods  -Continue IV antibiotics, Rocephin  -Follow-up blood cultures; will likely need to repeat    Elevated liver enzymes- (present on admission)  Assessment & Plan  -Likely related to fatty liver  -Follow cmp    Leucocytosis- (present on admission)  Assessment & Plan  -Related to pyelo    Pyelonephritis-  (present on admission)  Assessment & Plan  -On IV ceftriaxone  -Follow culture       VTE prophylaxis: enoxaparin ppx    I have performed a physical exam and reviewed and updated ROS and Plan today (11/14/2022). In review of yesterday's note (11/13/2022), there are no changes except as documented above.    ================================================================================================================================================================================  Please note that this dictation was created using voice recognition software. I have made every reasonable attempt to correct obvious errors, but there may be errors of grammar and possibly content that I did not discover before finalizing the note.    Electronically signed by:  Dr. STEVE Berumen, DNP, APRN, FNP-C  Hospitalist Services  [facility]  [phone]  [email or web address]  11/14/22                 1442    ================================================================================================================================================================================  Please note that this dictation was created using voice recognition software. I have made every reasonable attempt to correct obvious errors, but there may be errors of grammar and possibly content that I did not discover before finalizing the note.    Electronically signed by:  Dr. STEVE Berumen, DNP, APRN, FNP-C  Hospitalist Services  [facility]  [phone]  [email or web address]  11/14/22                 6331

## 2022-11-14 NOTE — CARE PLAN
Problem: Knowledge Deficit - Standard  Goal: Patient and family/care givers will demonstrate understanding of plan of care, disease process/condition, diagnostic tests and medications  Outcome: Progressing     Problem: Urinary - Renal Perfusion  Goal: Ability to achieve and maintain adequate renal perfusion and functioning will improve  Outcome: Progressing     Problem: Physical Regulation  Goal: Diagnostic test results will improve  Outcome: Progressing     Problem: Pain - Standard  Goal: Alleviation of pain or a reduction in pain to the patient’s comfort goal  Outcome: Progressing   The patient is Stable - Low risk of patient condition declining or worsening    Shift Goals  Clinical Goals: Pain control  Patient Goals: rest  Family Goals: rest    Progress made toward(s) clinical / shift goals:  The patient has been updated on plan of care. She has received pain medication  head HA     Patient is not progressing towards the following goals:

## 2022-11-14 NOTE — ED TRIAGE NOTES
Chief Complaint   Patient presents with    Flank Pain     Bilateral flanks for 3 weeks.  C/o dysuria, frequency.  Sent here by  for eval

## 2022-11-14 NOTE — HOSPITAL COURSE
Ms. Dulce Eric is a 37 y.o. female [place] speaking female with past medical history of hypothyroid who presented 11/13/2022 with bilateral flank pain for the past 3 weeks.      She stated that it started out with left-sided flank pain and later on moved to right-sided flank area.  She described the pain as sharp pain, radiated to the front, intermittent in nature, 5 out of 10 intensity.  Associated with dysuria and foul-smelling urine.  He did not go to see any doctor during that time. Finally she decided to come to ER     In ER, patient noted to have within normal limits vital signs except for elevated temperature at 39.7 and tachycardic at 140.  She was found to be sepsis related to pyelonephritis.  Sepsis protocol was initiated and IV antibiotic was given.  CT scan of the abdomen done and showed no acute finding.  She will be admitted for further management.    Patient monitored overnight.  On assessment the next day, patient is reporting subjective fever and chills.  Blood cultures also noted to be positive with gram-negative rods.  Patient currently on IV ABX, Rocephin for coverage.  Patient will be switched to inpatient status as she is anticipated to stay 2-3 midnights for management of bacteremia and pyelonephritis.

## 2022-11-14 NOTE — CARE PLAN
The patient is Stable - Low risk of patient condition declining or worsening    Shift Goals  Clinical Goals: IV abx, IV fluids, pain control  Patient Goals: pain control, rest  Family Goals: rest    Progress made toward(s) clinical / shift goals:    Problem: Knowledge Deficit - Standard  Goal: Patient and family/care givers will demonstrate understanding of plan of care, disease process/condition, diagnostic tests and medications  Outcome: Progressing     Problem: Hemodynamics  Goal: Patient's hemodynamics, fluid balance and neurologic status will be stable or improve  Outcome: Progressing     Problem: Fluid Volume  Goal: Fluid volume balance will be maintained  Outcome: Progressing     Problem: Urinary - Renal Perfusion  Goal: Ability to achieve and maintain adequate renal perfusion and functioning will improve  Outcome: Progressing     Problem: Physical Regulation  Goal: Diagnostic test results will improve  Outcome: Progressing  Goal: Signs and symptoms of infection will decrease  Outcome: Progressing       Patient is not progressing towards the following goals: n/a

## 2022-11-14 NOTE — ED NOTES
Med rec updated and complete. Allergies reviewed. Confirmed name and date of birth.  No antibiotic use in last 30 days.      Home Pharmacy [facility] [phone]

## 2022-11-14 NOTE — PROGRESS NOTES
"Chief Complaint   Patient presents with    Dysuria    Back Pain     X 3 weeks        HISTORY OF PRESENT ILLNESS: Patient is a pleasant 37 y.o. female who presents to urgent care today with complaints of flank pain. Her symptoms started three weeks ago. Since then she has had urinary symptoms. This week she has had fever, malaise, fatigue, nausea. She notes distant history of kidney infection. She took a medication this morning called \"XL3\" for pain relief. She is Citizen of [place] speaking, a  is used for entire visit.     There are no problems to display for this patient.      Allergies:Patient has no known allergies.    Current Outpatient Medications Ordered in Epic   Medication Sig Dispense Refill    levothyroxine (SYNTHROID) 25 MCG Tab TAKE 1 TABLET BY MOUTH IN THE MORNING ON AN EMPTY STOMACH FOR THYROID. REPEAT LABS IN 4-6 WEEKS      Prenatal Vit-Fe Fumarate-FA (PRENATAL VITAMINS) 28-0.8 MG Tab Take 1 Tablet by mouth every day. WITH FOOD       No current Epic-ordered facility-administered medications on file.       History reviewed. No pertinent past medical history.         No family status information on file.   History reviewed. No pertinent family history.    ROS:  Review of Systems   Constitutional: Positive for fever, chills, weight loss, malaise, and fatigue.   HENT: Negative for ear pain, nosebleeds, congestion, sore throat and neck pain.    Eyes: Negative for vision changes.   Neuro: Negative for headache, sensory changes, weakness, seizure, LOC.   Cardiovascular: Negative for chest pain, palpitations, orthopnea and leg swelling.   Respiratory: Negative for cough, sputum production, shortness of breath and wheezing.   Gastrointestinal: Positive for nausea. Negative for abdominal pain, vomiting or diarrhea.   Genitourinary: Positive for flank pain, dysuria, urgency and frequency.  Musculoskeletal: Negative for falls, neck pain, back pain, joint pain, myalgias.   Skin: Negative for rash, " diaphoresis.     Exam:  /82   Pulse (!) 140   Temp (!) 39.7 °C (103.4 °F) (Temporal)   Resp 16   Wt 69.9 kg (154 lb)   SpO2 95%   General: well-nourished, well-developed female in NAD  Head: normocephalic, atraumatic  Eyes: PERRLA, no conjunctival injection, acuity grossly intact, lids normal.  Ears: normal shape and symmetry, no tenderness, no discharge. External canals are without any significant edema or erythema. Tympanic membranes are without any inflammation, no effusion. Gross auditory acuity is intact.  Nose: symmetrical without tenderness, no discharge.  Mouth/Throat: reasonable hygiene, no erythema, exudates or tonsillar enlargement.  Neck: no masses, range of motion within normal limits, no tracheal deviation. No obvious thyroid enlargement.   Lymph: no cervical adenopathy. No supraclavicular adenopathy.   Neuro: alert and oriented. Cranial nerves 1-12 grossly intact. No sensory deficit.   Cardiovascular: Tachycardic rate and regular rhythm. No edema.  Pulmonary: no distress. Chest is symmetrical with respiration, no wheezes, crackles, or rhonchi.   Abdomen: soft, right lateral and suprapubic tenderness, no guarding, no hepatosplenomegaly.  Right CVA tenderness.  Musculoskeletal: no clubbing, appropriate muscle tone, gait is stable.  Skin: warm, dry, intact, no clubbing, no cyanosis, no rashes.   Psych: appropriate mood, affect, judgement.       POC pregnancy negative    POC urine positive for ketones, blood, nitrates, leukocytes      Assessment/Plan:  1. Acute pyelonephritis  POCT Urinalysis    POCT Pregnancy      2. Fever in adult  acetaminophen (TYLENOL) tablet 1,000 mg            Presentation consistent with pyelonephritis.  Patient tachycardic in clinic at 140 as well as febrile.  Patient is given Tylenol in clinic. At this time, I feel the patient requires a higher level of care in the ED for closer monitoring, stat lab work and/or imaging for further evaluation. This has been discussed  with the patient and she states agreement and understanding. The patient is stable to leave POV at this time and will go directly to ED without delay, taken by her .           Please note that this dictation was created using voice recognition software. I have made every reasonable attempt to correct obvious errors, but I expect that there are errors of grammar and possibly content that I did not discover before finalizing the note.      JACINTO Griffin.

## 2022-11-14 NOTE — PROGRESS NOTES
4 Eyes Skin Assessment Completed by ADELAIDA Gonzalez and ADELAIDA Ortez.    Head WDL  Ears WDL  Nose WDL  Mouth WDL  Neck WDL  Breast/Chest WDL  Shoulder Blades WDL  Spine WDL  (R) Arm/Elbow/Hand WDL  (L) Arm/Elbow/Hand WDL  Abdomen WDL  Groin WDL  Scrotum/Coccyx/Buttocks WDL  (R) Leg WDL  (L) Leg WDL  (R) Heel/Foot/Toe WDL  (L) Heel/Foot/Toe WDL          Devices In Places Blood Pressure Cuff and Pulse Ox      Interventions In Place N/A    Possible Skin Injury No    Pictures Uploaded Into Epic N/A  Wound Consult Placed N/A  RN Wound Prevention Protocol Ordered No

## 2022-11-15 PROBLEM — E87.1 HYPONATREMIA: Status: ACTIVE | Noted: 2022-11-15

## 2022-11-15 PROBLEM — E87.6 HYPOKALEMIA: Status: ACTIVE | Noted: 2022-11-15

## 2022-11-15 LAB
ANION GAP SERPL CALC-SCNC: 10 MMOL/L (ref 7–16)
BUN SERPL-MCNC: 7 MG/DL (ref 8–22)
CALCIUM SERPL-MCNC: 8.9 MG/DL (ref 8.5–10.5)
CHLORIDE SERPL-SCNC: 100 MMOL/L (ref 96–112)
CO2 SERPL-SCNC: 23 MMOL/L (ref 20–33)
CREAT SERPL-MCNC: 0.48 MG/DL (ref 0.5–1.4)
ERYTHROCYTE [DISTWIDTH] IN BLOOD BY AUTOMATED COUNT: 40.3 FL (ref 35.9–50)
GFR SERPLBLD CREATININE-BSD FMLA CKD-EPI: 124 ML/MIN/1.73 M 2
GLUCOSE SERPL-MCNC: 104 MG/DL (ref 65–99)
HCT VFR BLD AUTO: 36.1 % (ref 37–47)
HGB BLD-MCNC: 12.1 G/DL (ref 12–16)
MCH RBC QN AUTO: 27.9 PG (ref 27–33)
MCHC RBC AUTO-ENTMCNC: 33.5 G/DL (ref 33.6–35)
MCV RBC AUTO: 83.2 FL (ref 81.4–97.8)
PLATELET # BLD AUTO: 208 K/UL (ref 164–446)
PMV BLD AUTO: 8.7 FL (ref 9–12.9)
POTASSIUM SERPL-SCNC: 3.5 MMOL/L (ref 3.6–5.5)
RBC # BLD AUTO: 4.34 M/UL (ref 4.2–5.4)
SODIUM SERPL-SCNC: 133 MMOL/L (ref 135–145)
WBC # BLD AUTO: 10.1 K/UL (ref 4.8–10.8)

## 2022-11-15 PROCEDURE — 700102 HCHG RX REV CODE 250 W/ 637 OVERRIDE(OP): Performed by: INTERNAL MEDICINE

## 2022-11-15 PROCEDURE — A9270 NON-COVERED ITEM OR SERVICE: HCPCS | Performed by: INTERNAL MEDICINE

## 2022-11-15 PROCEDURE — 85027 COMPLETE CBC AUTOMATED: CPT

## 2022-11-15 PROCEDURE — 36415 COLL VENOUS BLD VENIPUNCTURE: CPT

## 2022-11-15 PROCEDURE — 80048 BASIC METABOLIC PNL TOTAL CA: CPT

## 2022-11-15 PROCEDURE — 700111 HCHG RX REV CODE 636 W/ 250 OVERRIDE (IP): Performed by: INTERNAL MEDICINE

## 2022-11-15 PROCEDURE — 99232 SBSQ HOSP IP/OBS MODERATE 35: CPT | Performed by: INTERNAL MEDICINE

## 2022-11-15 PROCEDURE — 770001 HCHG ROOM/CARE - MED/SURG/GYN PRIV*

## 2022-11-15 RX ADMIN — LEVOTHYROXINE SODIUM 25 MCG: 0.03 TABLET ORAL at 05:57

## 2022-11-15 RX ADMIN — SENNOSIDES AND DOCUSATE SODIUM 2 TABLET: 50; 8.6 TABLET ORAL at 18:17

## 2022-11-15 RX ADMIN — ENOXAPARIN SODIUM 40 MG: 40 INJECTION SUBCUTANEOUS at 18:17

## 2022-11-15 RX ADMIN — CEFTRIAXONE SODIUM 1000 MG: 10 INJECTION, POWDER, FOR SOLUTION INTRAVENOUS at 20:43

## 2022-11-15 RX ADMIN — OXYCODONE 2.5 MG: 5 TABLET ORAL at 13:00

## 2022-11-15 RX ADMIN — ACETAMINOPHEN 650 MG: 325 TABLET, FILM COATED ORAL at 10:30

## 2022-11-15 ASSESSMENT — ENCOUNTER SYMPTOMS
CARDIOVASCULAR NEGATIVE: 1
ABDOMINAL PAIN: 0
MYALGIAS: 1
CHILLS: 0
NAUSEA: 0
FEVER: 0
EYES NEGATIVE: 1
PSYCHIATRIC NEGATIVE: 1
FLANK PAIN: 1
WEAKNESS: 1
VOMITING: 0
GASTROINTESTINAL NEGATIVE: 1
RESPIRATORY NEGATIVE: 1

## 2022-11-15 NOTE — PROGRESS NOTES
Hospital Medicine Daily Progress Note    Date of Service  11/15/2022    Chief Complaint  Dulce Chow is a 37 y.o. female admitted 11/13/2022 with B/L flank pain    Hospital Course  Ms. Dulce Eric is a 37 y.o. female [place] Islander speaking female with past medical history of hypothyroid who presented 11/13/2022 with bilateral flank pain for the past 3 weeks.      She stated that it started out with left-sided flank pain and later on moved to right-sided flank area.  She described the pain as sharp pain, radiated to the front, intermittent in nature, 5 out of 10 intensity.  Associated with dysuria and foul-smelling urine.  He did not go to see any doctor during that time. Finally she decided to come to ER     In ER, patient noted to have within normal limits vital signs except for elevated temperature at 39.7 and tachycardic at 140.  She was found to be sepsis related to pyelonephritis.  Sepsis protocol was initiated and IV antibiotic was given.  CT scan of the abdomen done and showed no acute finding.  She will be admitted for further management.    Patient monitored overnight.  On assessment the next day, patient is reporting subjective fever and chills.  Blood cultures also noted to be positive with gram-negative rods.  Patient currently on IV ABX, Rocephin for coverage.  Patient will be switched to inpatient status as she is anticipated to stay 2-3 midnights for management of bacteremia and pyelonephritis.    Interval Problem Update  Pyelo/bacteremia-improving. Awaiting final speciation of blood cultures. Afebrile. She feels that her flank pain is improving. No N/V.     I have discussed this patient's plan of care and discharge plan at IDT rounds today with Case Management, Nursing, Nursing leadership, and other members of the IDT team.    Consultants/Specialty  NONE    Code Status  Full Code    Disposition  Patient is not medically cleared for discharge.   Anticipate discharge to to home with close outpatient  follow-up.  I have placed the appropriate orders for post-discharge needs.    Review of Systems  Review of Systems   Constitutional:  Positive for malaise/fatigue (improving). Negative for chills and fever.   HENT: Negative.     Eyes: Negative.    Respiratory: Negative.     Cardiovascular: Negative.    Gastrointestinal: Negative.  Negative for abdominal pain, nausea and vomiting.   Genitourinary:  Positive for dysuria, flank pain and urgency.        All aspects improving   Musculoskeletal:  Positive for myalgias.   Skin: Negative.    Neurological:  Positive for weakness.   Endo/Heme/Allergies: Negative.    Psychiatric/Behavioral: Negative.     All other systems reviewed and are negative.     Physical Exam  Temp:  [36.1 °C (97 °F)-38.7 °C (101.7 °F)] 36.7 °C (98.1 °F)  Pulse:  [] 90  Resp:  [16-18] 17  BP: (124-152)/() 132/90  SpO2:  [90 %-96 %] 90 %    Physical Exam  Vitals and nursing note reviewed.   HENT:      Head: Normocephalic.      Nose: Nose normal.   Eyes:      Pupils: Pupils are equal, round, and reactive to light.   Cardiovascular:      Rate and Rhythm: Normal rate and regular rhythm.      Pulses: Normal pulses.      Heart sounds: Normal heart sounds.   Pulmonary:      Effort: Pulmonary effort is normal.      Breath sounds: Normal breath sounds.   Abdominal:      General: Bowel sounds are normal.      Palpations: Abdomen is soft.      Tenderness: There is right CVA tenderness (mild).   Musculoskeletal:         General: No tenderness.      Cervical back: Normal range of motion and neck supple.   Skin:     General: Skin is dry.      Capillary Refill: Capillary refill takes 2 to 3 seconds.   Neurological:      Mental Status: She is alert. Mental status is at baseline.      Motor: No weakness.       Fluids    Intake/Output Summary (Last 24 hours) at 11/15/2022 1510  Last data filed at 11/15/2022 0900  Gross per 24 hour   Intake 240 ml   Output --   Net 240 ml       Laboratory  Recent Labs      11/13/22  1654 11/14/22  0658 11/15/22  0330   WBC 12.1* 9.4 10.1   RBC 5.03 4.49 4.34   HEMOGLOBIN 13.6 12.6 12.1   HEMATOCRIT 41.2 37.9 36.1*   MCV 81.9 84.4 83.2   MCH 27.0 28.1 27.9   MCHC 33.0* 33.2* 33.5*   RDW 39.9 42.5 40.3   PLATELETCT 254 227 208   MPV 8.6* 9.2 8.7*     Recent Labs     11/13/22  1654 11/14/22  0658 11/15/22  0330   SODIUM 134* 138 133*   POTASSIUM 3.5* 3.4* 3.5*   CHLORIDE 100 102 100   CO2 21 27 23   GLUCOSE 126* 99 104*   BUN 10 7* 7*   CREATININE 0.62 0.54 0.48*   CALCIUM 9.3 8.9 8.9     Recent Labs     11/14/22  0658   INR 1.06               Imaging  CT-ABDOMEN-PELVIS WITH   Final Result      1.  Small ill-defined area of hypodensity in the right superior pole kidney. This can be seen with changes of pyelonephritis.   2.  No hydronephrosis and no obstructive ureteral stones identified.   3.  Bladder is unremarkable with no CT evidence of cystitis.           Assessment/Plan  * Sepsis (HCC)- (present on admission)  Assessment & Plan  resolved        Hypokalemia- (present on admission)  Assessment & Plan  Replacement, normalizing    Hyponatremia  Assessment & Plan  Hypovolemic, improving, encourage oral fluids    Bacteremia- (present on admission)  Assessment & Plan  - Noted positive blood culture with gram-negative rods  -Continue IV antibiotics, Rocephin  -Follow-up blood cultures; f/u final speciation    Elevated liver enzymes- (present on admission)  Assessment & Plan  -Likely related to fatty liver  -Follow cmp, nearly resolved    Leucocytosis- (present on admission)  Assessment & Plan  -Related to pyelo, resolved    Pyelonephritis- (present on admission)  Assessment & Plan  -On IV ceftriaxone  -Follow culture, awaiting final speciation       VTE prophylaxis: enoxaparin ppx

## 2022-11-15 NOTE — CARE PLAN
Problem: Knowledge Deficit - Standard  Goal: Patient and family/care givers will demonstrate understanding of plan of care, disease process/condition, diagnostic tests and medications  Outcome: Progressing     Problem: Hemodynamics  Goal: Patient's hemodynamics, fluid balance and neurologic status will be stable or improve  Outcome: Progressing     Problem: Fluid Volume  Goal: Fluid volume balance will be maintained  Outcome: Progressing     Problem: Urinary - Renal Perfusion  Goal: Ability to achieve and maintain adequate renal perfusion and functioning will improve  Outcome: Progressing     Problem: Respiratory  Goal: Patient will achieve/maintain optimum respiratory ventilation and gas exchange  Outcome: Progressing     Problem: Mechanical Ventilation  Goal: Safe management of artificial airway and ventilation  Outcome: Progressing  Goal: Successful weaning off mechanical ventilator, spontaneously maintains adequate gas exchange  Outcome: Progressing  Goal: Patient will be able to express needs and understand communication  Outcome: Progressing     Problem: Physical Regulation  Goal: Diagnostic test results will improve  Outcome: Progressing  Goal: Signs and symptoms of infection will decrease  Outcome: Progressing     Problem: Pain - Standard  Goal: Alleviation of pain or a reduction in pain to the patient’s comfort goal  Outcome: Progressing

## 2022-11-16 LAB
ANION GAP SERPL CALC-SCNC: 11 MMOL/L (ref 7–16)
BACTERIA BLD CULT: ABNORMAL
BACTERIA BLD CULT: ABNORMAL
BACTERIA UR CULT: ABNORMAL
BACTERIA UR CULT: ABNORMAL
BUN SERPL-MCNC: 9 MG/DL (ref 8–22)
CALCIUM SERPL-MCNC: 9.5 MG/DL (ref 8.5–10.5)
CHLORIDE SERPL-SCNC: 100 MMOL/L (ref 96–112)
CO2 SERPL-SCNC: 25 MMOL/L (ref 20–33)
CREAT SERPL-MCNC: 0.64 MG/DL (ref 0.5–1.4)
ERYTHROCYTE [DISTWIDTH] IN BLOOD BY AUTOMATED COUNT: 40 FL (ref 35.9–50)
GFR SERPLBLD CREATININE-BSD FMLA CKD-EPI: 116 ML/MIN/1.73 M 2
GLUCOSE SERPL-MCNC: 108 MG/DL (ref 65–99)
HCT VFR BLD AUTO: 40.7 % (ref 37–47)
HGB BLD-MCNC: 13.2 G/DL (ref 12–16)
MCH RBC QN AUTO: 26.9 PG (ref 27–33)
MCHC RBC AUTO-ENTMCNC: 32.4 G/DL (ref 33.6–35)
MCV RBC AUTO: 83.1 FL (ref 81.4–97.8)
PLATELET # BLD AUTO: 289 K/UL (ref 164–446)
PMV BLD AUTO: 8.9 FL (ref 9–12.9)
POTASSIUM SERPL-SCNC: 3.3 MMOL/L (ref 3.6–5.5)
RBC # BLD AUTO: 4.9 M/UL (ref 4.2–5.4)
SIGNIFICANT IND 70042: ABNORMAL
SIGNIFICANT IND 70042: ABNORMAL
SITE SITE: ABNORMAL
SITE SITE: ABNORMAL
SODIUM SERPL-SCNC: 136 MMOL/L (ref 135–145)
SOURCE SOURCE: ABNORMAL
SOURCE SOURCE: ABNORMAL
WBC # BLD AUTO: 7.1 K/UL (ref 4.8–10.8)

## 2022-11-16 PROCEDURE — 85027 COMPLETE CBC AUTOMATED: CPT

## 2022-11-16 PROCEDURE — 770001 HCHG ROOM/CARE - MED/SURG/GYN PRIV*

## 2022-11-16 PROCEDURE — 80048 BASIC METABOLIC PNL TOTAL CA: CPT

## 2022-11-16 PROCEDURE — 99233 SBSQ HOSP IP/OBS HIGH 50: CPT | Performed by: STUDENT IN AN ORGANIZED HEALTH CARE EDUCATION/TRAINING PROGRAM

## 2022-11-16 PROCEDURE — 700111 HCHG RX REV CODE 636 W/ 250 OVERRIDE (IP): Performed by: INTERNAL MEDICINE

## 2022-11-16 PROCEDURE — A9270 NON-COVERED ITEM OR SERVICE: HCPCS | Performed by: INTERNAL MEDICINE

## 2022-11-16 PROCEDURE — 700102 HCHG RX REV CODE 250 W/ 637 OVERRIDE(OP): Performed by: INTERNAL MEDICINE

## 2022-11-16 RX ADMIN — SENNOSIDES AND DOCUSATE SODIUM 2 TABLET: 50; 8.6 TABLET ORAL at 17:58

## 2022-11-16 RX ADMIN — CEFTRIAXONE SODIUM 1000 MG: 10 INJECTION, POWDER, FOR SOLUTION INTRAVENOUS at 20:51

## 2022-11-16 RX ADMIN — ENOXAPARIN SODIUM 40 MG: 40 INJECTION SUBCUTANEOUS at 17:57

## 2022-11-16 RX ADMIN — LEVOTHYROXINE SODIUM 25 MCG: 0.03 TABLET ORAL at 05:07

## 2022-11-16 ASSESSMENT — ENCOUNTER SYMPTOMS
CARDIOVASCULAR NEGATIVE: 1
GASTROINTESTINAL NEGATIVE: 1
PSYCHIATRIC NEGATIVE: 1
MYALGIAS: 0
CHILLS: 0
NAUSEA: 0
FEVER: 0
FLANK PAIN: 1
EYES NEGATIVE: 1
RESPIRATORY NEGATIVE: 1
WEAKNESS: 1
ABDOMINAL PAIN: 0
VOMITING: 0

## 2022-11-16 NOTE — CARE PLAN
The patient is Stable - Low risk of patient condition declining or worsening    Shift Goals  Clinical Goals: IV ATB  Patient Goals: rest  Family Goals: rest    Progress made toward(s) clinical / shift goals:  See flowsheets for assessment details. All meds given as ordered. Pt ambulating and resting comfortably throughout shift.      Problem: Knowledge Deficit - Standard  Goal: Patient and family/care givers will demonstrate understanding of plan of care, disease process/condition, diagnostic tests and medications  Outcome: Progressing     Problem: Hemodynamics  Goal: Patient's hemodynamics, fluid balance and neurologic status will be stable or improve  Outcome: Progressing     Problem: Fluid Volume  Goal: Fluid volume balance will be maintained  Outcome: Progressing     Problem: Urinary - Renal Perfusion  Goal: Ability to achieve and maintain adequate renal perfusion and functioning will improve  Outcome: Progressing     Problem: Respiratory  Goal: Patient will achieve/maintain optimum respiratory ventilation and gas exchange  Outcome: Progressing     Problem: Mechanical Ventilation  Goal: Safe management of artificial airway and ventilation  Outcome: Progressing  Goal: Successful weaning off mechanical ventilator, spontaneously maintains adequate gas exchange  Outcome: Progressing  Goal: Patient will be able to express needs and understand communication  Outcome: Progressing     Problem: Physical Regulation  Goal: Diagnostic test results will improve  Outcome: Progressing  Goal: Signs and symptoms of infection will decrease  Outcome: Progressing     Problem: Pain - Standard  Goal: Alleviation of pain or a reduction in pain to the patient’s comfort goal  Outcome: Progressing

## 2022-11-16 NOTE — PROGRESS NOTES
Hospital Medicine Daily Progress Note    Date of Service  11/16/2022    Chief Complaint  Dulce Chow is a 37 y.o. female admitted 11/13/2022 with B/L flank pain    Hospital Course  Ms. Dulce Eric is a 37 y.o. female [place] speaking female with past medical history of hypothyroid who presented 11/13/2022 with bilateral flank pain for the past 3 weeks.      She stated that it started out with left-sided flank pain and later on moved to right-sided flank area.  She described the pain as sharp pain, radiated to the front, intermittent in nature, 5 out of 10 intensity.  Associated with dysuria and foul-smelling urine.  He did not go to see any doctor during that time. Finally she decided to come to ER     In ER, patient noted to have within normal limits vital signs except for elevated temperature at 39.7 and tachycardic at 140.  She was found to be sepsis related to pyelonephritis.  Sepsis protocol was initiated and IV antibiotic was given.  CT scan of the abdomen done and showed no acute finding.  She will be admitted for further management.    Patient monitored overnight.  On assessment the next day, patient is reporting subjective fever and chills.  Blood cultures also noted to be positive with gram-negative rods.  Patient currently on IV ABX, Rocephin for coverage.  Patient will be switched to inpatient status as she is anticipated to stay 2-3 midnights for management of bacteremia and pyelonephritis.    Interval Problem Update  Mild abdominal discomforts, no more dysuria    Urine culture positive for E. Coli, pansensitive  blood culture positive for E. Coli, pansensitive  Continue Rocephin, changed to po at the discharge    Potassium 3.3-replaced    I have discussed this patient's plan of care and discharge plan at IDT rounds today with Case Management, Nursing, Nursing leadership, and other members of the IDT team.    Consultants/Specialty  NONE    Code Status  Full Code    Disposition  Patient is not  medically cleared for discharge.   Anticipate discharge to to home with close outpatient follow-up.  I have placed the appropriate orders for post-discharge needs.    Review of Systems  Review of Systems   Constitutional:  Positive for malaise/fatigue (improving). Negative for chills and fever.   HENT: Negative.     Eyes: Negative.    Respiratory: Negative.     Cardiovascular: Negative.    Gastrointestinal: Negative.  Negative for abdominal pain, nausea and vomiting.   Genitourinary:  Positive for flank pain. Negative for dysuria and urgency.        All aspects improving   Musculoskeletal:  Negative for myalgias.   Skin: Negative.    Neurological:  Positive for weakness.   Endo/Heme/Allergies: Negative.    Psychiatric/Behavioral: Negative.     All other systems reviewed and are negative.     Physical Exam  Temp:  [36.3 °C (97.3 °F)-36.7 °C (98 °F)] 36.3 °C (97.3 °F)  Pulse:  [83-95] 83  Resp:  [17-18] 18  BP: (122-142)/() 122/87  SpO2:  [94 %-97 %] 97 %    Physical Exam  Vitals and nursing note reviewed.   HENT:      Head: Normocephalic.      Nose: Nose normal.   Eyes:      Pupils: Pupils are equal, round, and reactive to light.   Cardiovascular:      Rate and Rhythm: Normal rate and regular rhythm.      Pulses: Normal pulses.      Heart sounds: Normal heart sounds.   Pulmonary:      Effort: Pulmonary effort is normal.      Breath sounds: Normal breath sounds.   Abdominal:      General: Bowel sounds are normal.      Palpations: Abdomen is soft.      Tenderness: There is right CVA tenderness (mild).   Musculoskeletal:         General: No tenderness.      Cervical back: Normal range of motion and neck supple.   Skin:     General: Skin is dry.      Capillary Refill: Capillary refill takes 2 to 3 seconds.   Neurological:      Mental Status: She is alert and oriented to person, place, and time. Mental status is at baseline.      Motor: No weakness.   Psychiatric:         Mood and Affect: Mood normal.        Fluids  No intake or output data in the 24 hours ending 11/16/22 1320      Laboratory  Recent Labs     11/14/22  0658 11/15/22  0330 11/16/22  0848   WBC 9.4 10.1 7.1   RBC 4.49 4.34 4.90   HEMOGLOBIN 12.6 12.1 13.2   HEMATOCRIT 37.9 36.1* 40.7   MCV 84.4 83.2 83.1   MCH 28.1 27.9 26.9*   MCHC 33.2* 33.5* 32.4*   RDW 42.5 40.3 40.0   PLATELETCT 227 208 289   MPV 9.2 8.7* 8.9*     Recent Labs     11/14/22  0658 11/15/22  0330 11/16/22  0848   SODIUM 138 133* 136   POTASSIUM 3.4* 3.5* 3.3*   CHLORIDE 102 100 100   CO2 27 23 25   GLUCOSE 99 104* 108*   BUN 7* 7* 9   CREATININE 0.54 0.48* 0.64   CALCIUM 8.9 8.9 9.5     Recent Labs     11/14/22  0658   INR 1.06               Imaging  CT-ABDOMEN-PELVIS WITH   Final Result      1.  Small ill-defined area of hypodensity in the right superior pole kidney. This can be seen with changes of pyelonephritis.   2.  No hydronephrosis and no obstructive ureteral stones identified.   3.  Bladder is unremarkable with no CT evidence of cystitis.           Assessment/Plan  * Bacteremia- (present on admission)  Assessment & Plan  Source of UTI  blood culture positive for E. Coli, pansensitive  Continue Rocephin, changed to po at the discharge    Pyelonephritis- (present on admission)  Assessment & Plan  -On IV ceftriaxone  Urine culture positive for E. Coli, pansensitive    Hypokalemia- (present on admission)  Assessment & Plan  Potassium 3.3-replaced    Monitor    Hyponatremia  Assessment & Plan  Hypovolemic, improving, encourage oral fluids    Elevated liver enzymes- (present on admission)  Assessment & Plan  -Likely related to fatty liver  -Follow cmp, nearly resolved    Leucocytosis- (present on admission)  Assessment & Plan  -Related to pyelo, resolved    Sepsis (HCC)- (present on admission)  Assessment & Plan  resolved           VTE prophylaxis: enoxaparin ppx

## 2022-11-17 ENCOUNTER — PHARMACY VISIT (OUTPATIENT)
Dept: PHARMACY | Facility: MEDICAL CENTER | Age: 37
End: 2022-11-17
Payer: COMMERCIAL

## 2022-11-17 VITALS
HEART RATE: 82 BPM | OXYGEN SATURATION: 97 % | RESPIRATION RATE: 16 BRPM | SYSTOLIC BLOOD PRESSURE: 121 MMHG | WEIGHT: 156.75 LBS | TEMPERATURE: 97.2 F | BODY MASS INDEX: 28.84 KG/M2 | HEIGHT: 62 IN | DIASTOLIC BLOOD PRESSURE: 81 MMHG

## 2022-11-17 LAB
ANION GAP SERPL CALC-SCNC: 11 MMOL/L (ref 7–16)
BUN SERPL-MCNC: 13 MG/DL (ref 8–22)
CALCIUM SERPL-MCNC: 9.5 MG/DL (ref 8.5–10.5)
CHLORIDE SERPL-SCNC: 101 MMOL/L (ref 96–112)
CO2 SERPL-SCNC: 25 MMOL/L (ref 20–33)
CREAT SERPL-MCNC: 0.58 MG/DL (ref 0.5–1.4)
GFR SERPLBLD CREATININE-BSD FMLA CKD-EPI: 118 ML/MIN/1.73 M 2
GLUCOSE SERPL-MCNC: 95 MG/DL (ref 65–99)
POTASSIUM SERPL-SCNC: 3.9 MMOL/L (ref 3.6–5.5)
SODIUM SERPL-SCNC: 137 MMOL/L (ref 135–145)

## 2022-11-17 PROCEDURE — 99239 HOSP IP/OBS DSCHRG MGMT >30: CPT | Performed by: STUDENT IN AN ORGANIZED HEALTH CARE EDUCATION/TRAINING PROGRAM

## 2022-11-17 PROCEDURE — RXMED WILLOW AMBULATORY MEDICATION CHARGE: Performed by: STUDENT IN AN ORGANIZED HEALTH CARE EDUCATION/TRAINING PROGRAM

## 2022-11-17 PROCEDURE — 80048 BASIC METABOLIC PNL TOTAL CA: CPT

## 2022-11-17 PROCEDURE — A9270 NON-COVERED ITEM OR SERVICE: HCPCS | Performed by: INTERNAL MEDICINE

## 2022-11-17 PROCEDURE — 700102 HCHG RX REV CODE 250 W/ 637 OVERRIDE(OP): Performed by: INTERNAL MEDICINE

## 2022-11-17 RX ORDER — CEPHALEXIN 500 MG/1
500 CAPSULE ORAL 4 TIMES DAILY
Qty: 28 CAPSULE | Refills: 0 | Status: SHIPPED | OUTPATIENT
Start: 2022-11-17 | End: 2022-11-24

## 2022-11-17 RX ADMIN — LEVOTHYROXINE SODIUM 25 MCG: 0.03 TABLET ORAL at 05:06

## 2022-11-17 NOTE — DISCHARGE SUMMARY
Discharge Summary    CHIEF COMPLAINT ON ADMISSION  Chief Complaint   Patient presents with    Flank Pain     Bilateral flanks for 3 weeks.  C/o dysuria, frequency.  Sent here by  for eval       Reason for Admission  Sent by      Admission Date  11/13/2022    CODE STATUS  Prior    HPI & HOSPITAL COURSE  Ms. Dulce Eric is a 37 y.o. female Uzbek speaking female with past medical history of hypothyroid who presented 11/13/2022 with bilateral flank pain for the past 3 weeks.  In ER,  She was found to be sepsis related to pyelonephritis.   CT scan of the abdomen c/w pylonephritis.       Patient monitored overnight.  On assessment the next day, patient is reporting subjective fever and chills.  Blood cultures also noted to be positive with gram-negative rods.  Patient currently on IV ABX, Rocephin for coverage.  Patient will be switched to inpatient status as she is anticipated to stay 2-3 midnights for management of bacteremia and pyelonephritis.Urine culture and blood culture positive for E. Coli, pansensitive. Patient received 3 days of Rocephin, transitioned to Keflex to complete a 10 days course.     Therefore, she is discharged in good and stable condition to home with close outpatient follow-up.    The patient met 2-midnight criteria for an inpatient stay at the time of discharge.    Discharge Date  11/17/22    FOLLOW UP ITEMS POST DISCHARGE  - Follow up with primary care physician in 1 week.     - Please take the medications as instructed    - Go to the local Emergency Department if you have any worsening condition.       DISCHARGE DIAGNOSES  Principal Problem:    Bacteremia POA: Yes  Active Problems:    Pyelonephritis POA: Yes    Sepsis (HCC) POA: Yes    Leucocytosis POA: Yes    Elevated liver enzymes POA: Yes    Hyponatremia POA: No    Hypokalemia POA: Yes  Resolved Problems:    * No resolved hospital problems. *      FOLLOW UP  - Follow up with primary care physician in 1 week.     MEDICATIONS ON  DISCHARGE     Medication List        START taking these medications        Instructions   cephALEXin 500 MG Caps  Commonly known as: KEFLEX   Take 1 Capsule by mouth 4 times a day for 7 days.  Dose: 500 mg            CONTINUE taking these medications        Instructions   levothyroxine 25 MCG Tabs  Commonly known as: SYNTHROID   Take 1 Tablet by mouth every morning on an empty stomach.  Dose: 25 mcg     Prenatal Vitamins 28-0.8 MG Tabs   Take 1 Tablet by mouth every day. WITH FOOD  Dose: 1 Tablet              Allergies  No Known Allergies    DIET  No orders of the defined types were placed in this encounter.      ACTIVITY  As tolerated.  Weight bearing as tolerated    CONSULTATIONS      PROCEDURES      LABORATORY  Lab Results   Component Value Date    SODIUM 137 11/17/2022    POTASSIUM 3.9 11/17/2022    CHLORIDE 101 11/17/2022    CO2 25 11/17/2022    GLUCOSE 95 11/17/2022    BUN 13 11/17/2022    CREATININE 0.58 11/17/2022        Lab Results   Component Value Date    WBC 7.1 11/16/2022    HEMOGLOBIN 13.2 11/16/2022    HEMATOCRIT 40.7 11/16/2022    PLATELETCT 289 11/16/2022        Total time of the discharge process exceeds 35 minutes.

## 2022-11-17 NOTE — CARE PLAN
The patient is Stable - Low risk of patient condition declining or worsening    Shift Goals  Clinical Goals: IV ATB  Patient Goals: rest  Family Goals: anna    Progress made toward(s) clinical / shift goals:  PT resting comfortable throughout shift. IV antibiotic given as ordered.      Problem: Knowledge Deficit - Standard  Goal: Patient and family/care givers will demonstrate understanding of plan of care, disease process/condition, diagnostic tests and medications  Outcome: Progressing     Problem: Hemodynamics  Goal: Patient's hemodynamics, fluid balance and neurologic status will be stable or improve  Outcome: Progressing     Problem: Fluid Volume  Goal: Fluid volume balance will be maintained  Outcome: Progressing     Problem: Urinary - Renal Perfusion  Goal: Ability to achieve and maintain adequate renal perfusion and functioning will improve  Outcome: Progressing     Problem: Respiratory  Goal: Patient will achieve/maintain optimum respiratory ventilation and gas exchange  Outcome: Progressing     Problem: Mechanical Ventilation  Goal: Safe management of artificial airway and ventilation  Outcome: Progressing  Goal: Successful weaning off mechanical ventilator, spontaneously maintains adequate gas exchange  Outcome: Progressing  Goal: Patient will be able to express needs and understand communication  Outcome: Progressing     Problem: Physical Regulation  Goal: Diagnostic test results will improve  Outcome: Progressing  Goal: Signs and symptoms of infection will decrease  Outcome: Progressing     Problem: Pain - Standard  Goal: Alleviation of pain or a reduction in pain to the patient’s comfort goal  Outcome: Progressing

## 2022-11-17 NOTE — PROGRESS NOTES
Pt in no acute distress no SOB. Discharge instruction information reviewed with pt. All questions answered. PIV removed. Prescriptions filled and sent with patient. Family patient assisting with ride.

## 2022-11-18 LAB
BACTERIA BLD CULT: NORMAL
SIGNIFICANT IND 70042: NORMAL
SITE SITE: NORMAL
SOURCE SOURCE: NORMAL

## 2022-11-24 ENCOUNTER — OFFICE VISIT (OUTPATIENT)
Dept: URGENT CARE | Facility: PHYSICIAN GROUP | Age: 37
End: 2022-11-24

## 2022-11-24 VITALS
BODY MASS INDEX: 28.71 KG/M2 | OXYGEN SATURATION: 97 % | SYSTOLIC BLOOD PRESSURE: 114 MMHG | DIASTOLIC BLOOD PRESSURE: 70 MMHG | HEIGHT: 62 IN | HEART RATE: 88 BPM | TEMPERATURE: 97.8 F | RESPIRATION RATE: 16 BRPM | WEIGHT: 156 LBS

## 2022-11-24 DIAGNOSIS — R10.819 SUPRAPUBIC TENDERNESS: ICD-10-CM

## 2022-11-24 PROCEDURE — 99213 OFFICE O/P EST LOW 20 MIN: CPT | Performed by: NURSE PRACTITIONER

## 2022-11-24 ASSESSMENT — ENCOUNTER SYMPTOMS
RESPIRATORY NEGATIVE: 1
FEVER: 0
ROS GI COMMENTS: SUPRAPUBIC
ABDOMINAL PAIN: 1
PSYCHIATRIC NEGATIVE: 1
MUSCULOSKELETAL NEGATIVE: 1
NEUROLOGICAL NEGATIVE: 1
CONSTITUTIONAL NEGATIVE: 1
EYES NEGATIVE: 1
CARDIOVASCULAR NEGATIVE: 1

## 2022-11-24 ASSESSMENT — FIBROSIS 4 INDEX: FIB4 SCORE: 0.51

## 2022-11-25 NOTE — PROGRESS NOTES
"Subjective:   Dulce Chow is a 37 y.o. female who presents for Other (She was in the hospital 4 days last week due to pienefritis she still having pain)      Patient presents today with concerns that she may have a continued urinary tract infection.  Patient was recently hospitalized at [facility] 11/13 through 11/17 with sepsis due to pyelonephritis.  She was discharged with oral antibiotics, and she has completed those today.  Patient states that she has been afebrile and feels well since discharge, however she does have a little bit of suprapubic pain and is concerned that perhaps she has an ongoing urinary tract infection.  Patient does have scheduled follow-up on Monday, November 28, 2022, with her primary for follow-up.  Patient denies dysuria, urgency, frequency, or malodorous urine.  She denies flank pain or low back pain.  She states she is also finishing her menstrual cycle and does not know if this pain could be from that.      Review of Systems   Constitutional: Negative.  Negative for fever.   HENT: Negative.     Eyes: Negative.    Respiratory: Negative.     Cardiovascular: Negative.    Gastrointestinal:  Positive for abdominal pain.        Suprapubic   Genitourinary: Negative.    Musculoskeletal: Negative.    Skin: Negative.    Neurological: Negative.    Endo/Heme/Allergies: Negative.    Psychiatric/Behavioral: Negative.       Medications, Allergies, and current problem list reviewed today in Epic.     Objective:     /70 (BP Location: Right arm, Patient Position: Sitting, BP Cuff Size: Adult)   Pulse 88   Temp 36.6 °C (97.8 °F) (Temporal)   Resp 16   Ht 1.575 m (5' 2\")   Wt 70.8 kg (156 lb)   SpO2 97%     Physical Exam  Vitals reviewed.   Constitutional:       Appearance: Normal appearance.   HENT:      Head: Normocephalic and atraumatic.      Nose: Nose normal.      Mouth/Throat:      Mouth: Mucous membranes are moist.      Pharynx: Oropharynx is clear.   Eyes:      Extraocular Movements: " Extraocular movements intact.      Conjunctiva/sclera: Conjunctivae normal.      Pupils: Pupils are equal, round, and reactive to light.   Cardiovascular:      Rate and Rhythm: Normal rate and regular rhythm.   Pulmonary:      Effort: Pulmonary effort is normal.      Breath sounds: Normal breath sounds.   Abdominal:      General: Abdomen is flat.      Palpations: Abdomen is soft.      Tenderness: There is abdominal tenderness.      Comments: Suprapubic tenderness.  Otherwise benign abdominal exam.  Negative flank pain   Musculoskeletal:         General: Normal range of motion.      Cervical back: Normal range of motion and neck supple.   Skin:     General: Skin is warm and dry.      Capillary Refill: Capillary refill takes less than 2 seconds.   Neurological:      General: No focal deficit present.      Mental Status: She is alert.   Psychiatric:         Mood and Affect: Mood normal.         Behavior: Behavior normal.     UA remarkable only for light blood, but patient is currently on her period.    Assessment/Plan:     Diagnosis and associated orders:     1. Suprapubic tenderness           Comments/MDM:     Reassured patient that her urinalysis is normal today with no indication of infection. Patient was told that if she develops fever, chills or any other signs of infection she will present to ER immediately.  She will keep her follow up appointment with her PCP as scheduled on Monday, 11/28/2022.         Differential diagnosis, natural history, supportive care, and indications for immediate follow-up discussed.    Advised the patient to follow-up with the primary care physician for recheck, reevaluation, and consideration of further management.    Please note that this dictation was created using voice recognition software. I have made a reasonable attempt to correct obvious errors, but I expect that there are errors of grammar and possibly content that I did not discover before finalizing the note.    This note  was electronically signed by RAND Lim

## 2023-02-15 ENCOUNTER — OFFICE VISIT (OUTPATIENT)
Dept: URGENT CARE | Facility: PHYSICIAN GROUP | Age: 38
End: 2023-02-15

## 2023-02-15 VITALS
BODY MASS INDEX: 29.96 KG/M2 | RESPIRATION RATE: 12 BRPM | HEIGHT: 62 IN | OXYGEN SATURATION: 98 % | HEART RATE: 78 BPM | SYSTOLIC BLOOD PRESSURE: 128 MMHG | TEMPERATURE: 97.1 F | WEIGHT: 162.8 LBS | DIASTOLIC BLOOD PRESSURE: 82 MMHG

## 2023-02-15 DIAGNOSIS — H10.31 ACUTE CONJUNCTIVITIS OF RIGHT EYE, UNSPECIFIED ACUTE CONJUNCTIVITIS TYPE: ICD-10-CM

## 2023-02-15 PROCEDURE — 99213 OFFICE O/P EST LOW 20 MIN: CPT | Performed by: FAMILY MEDICINE

## 2023-02-15 RX ORDER — NAPROXEN 375 MG/1
TABLET ORAL
COMMUNITY
Start: 2023-01-17 | End: 2023-02-15

## 2023-02-15 RX ORDER — POLYMYXIN B SULFATE AND TRIMETHOPRIM 1; 10000 MG/ML; [USP'U]/ML
1 SOLUTION OPHTHALMIC 4 TIMES DAILY
Qty: 10 ML | Refills: 0 | Status: SHIPPED | OUTPATIENT
Start: 2023-02-15 | End: 2023-02-22

## 2023-02-15 ASSESSMENT — FIBROSIS 4 INDEX: FIB4 SCORE: 0.51

## 2023-02-20 ASSESSMENT — ENCOUNTER SYMPTOMS
NAUSEA: 0
WEIGHT LOSS: 0
VOMITING: 0
PHOTOPHOBIA: 0
MYALGIAS: 0
DOUBLE VISION: 0

## 2023-02-21 NOTE — PROGRESS NOTES
"Subjective     Dulce Chow is a 37 y.o. female who presents with Pink Eye (R eye redness, itchiness, minor pain, blurry vision, x3 days )            3 days right eye red draining and morning mattering.  No contact lens use.  No known exposures.  No vision loss.  No trauma or foreign body.  Associated nasal congestion.  No earache.  No other aggravating alleviating factors.      Review of Systems   Constitutional:  Negative for malaise/fatigue and weight loss.   Eyes:  Negative for double vision and photophobia.   Gastrointestinal:  Negative for nausea and vomiting.   Musculoskeletal:  Negative for joint pain and myalgias.   Skin:  Negative for itching and rash.            Objective     /82 (BP Location: Right arm, Patient Position: Sitting, BP Cuff Size: Adult)   Pulse 78   Temp 36.2 °C (97.1 °F) (Temporal)   Resp 12   Ht 1.575 m (5' 2\")   Wt 73.8 kg (162 lb 12.8 oz)   SpO2 98%   BMI 29.78 kg/m²      Physical Exam  Constitutional:       General: She is not in acute distress.     Appearance: She is well-developed.   HENT:      Head: Normocephalic and atraumatic.      Nose: Congestion present.      Mouth/Throat:      Mouth: Mucous membranes are moist.      Pharynx: No posterior oropharyngeal erythema.   Eyes:      Extraocular Movements: Extraocular movements intact.      Pupils: Pupils are equal, round, and reactive to light.      Comments: R conjunctiva injected with moderate exudate. No foreign body. No gross corneal lesion.     Cardiovascular:      Rate and Rhythm: Normal rate and regular rhythm.      Heart sounds: Normal heart sounds. No murmur heard.  Pulmonary:      Effort: Pulmonary effort is normal.      Breath sounds: Normal breath sounds. No wheezing.   Skin:     General: Skin is warm and dry.      Findings: No rash.   Neurological:      Mental Status: She is alert.                           Assessment & Plan        1. Acute conjunctivitis of right eye, unspecified acute conjunctivitis " type    - polymixin-trimethoprim (POLYTRIM) 02176-5.1 UNIT/ML-% Solution; Administer 1 Drop into both eyes 4 times a day for 7 days.  Dispense: 10 mL; Refill: 0    Differential diagnosis, natural history, supportive care, and indications for immediate follow-up were discussed.

## 2023-08-02 PROBLEM — O09.529 ADVANCED MATERNAL AGE IN MULTIGRAVIDA: Status: ACTIVE | Noted: 2023-08-02

## 2023-08-04 ENCOUNTER — APPOINTMENT (OUTPATIENT)
Dept: OBGYN | Facility: CLINIC | Age: 38
End: 2023-08-04
Payer: MEDICAID

## 2023-08-04 ENCOUNTER — APPOINTMENT (OUTPATIENT)
Dept: OBGYN | Facility: CLINIC | Age: 38
End: 2023-08-04

## 2023-08-04 ENCOUNTER — APPOINTMENT (OUTPATIENT)
Dept: RADIOLOGY | Facility: IMAGING CENTER | Age: 38
End: 2023-08-04
Payer: MEDICAID

## 2023-08-04 DIAGNOSIS — N91.2 AMENORRHEA: ICD-10-CM

## 2023-08-04 PROCEDURE — 76801 OB US < 14 WKS SINGLE FETUS: CPT | Mod: TC | Performed by: PHYSICIAN ASSISTANT

## 2023-08-04 PROCEDURE — 76817 TRANSVAGINAL US OBSTETRIC: CPT | Mod: TC | Performed by: PHYSICIAN ASSISTANT

## 2023-08-19 ENCOUNTER — APPOINTMENT (OUTPATIENT)
Dept: RADIOLOGY | Facility: MEDICAL CENTER | Age: 38
End: 2023-08-19
Attending: EMERGENCY MEDICINE
Payer: MEDICAID

## 2023-08-19 ENCOUNTER — HOSPITAL ENCOUNTER (EMERGENCY)
Facility: MEDICAL CENTER | Age: 38
End: 2023-08-19
Attending: EMERGENCY MEDICINE
Payer: MEDICAID

## 2023-08-19 VITALS
RESPIRATION RATE: 16 BRPM | HEART RATE: 80 BPM | WEIGHT: 163.8 LBS | OXYGEN SATURATION: 97 % | BODY MASS INDEX: 30.14 KG/M2 | TEMPERATURE: 97.6 F | HEIGHT: 62 IN | SYSTOLIC BLOOD PRESSURE: 120 MMHG | DIASTOLIC BLOOD PRESSURE: 71 MMHG

## 2023-08-19 DIAGNOSIS — O20.0 THREATENED MISCARRIAGE: ICD-10-CM

## 2023-08-19 DIAGNOSIS — O41.8X10 SUBCHORIONIC HEMORRHAGE OF PLACENTA IN FIRST TRIMESTER, SINGLE OR UNSPECIFIED FETUS: ICD-10-CM

## 2023-08-19 DIAGNOSIS — O46.8X1 SUBCHORIONIC HEMORRHAGE OF PLACENTA IN FIRST TRIMESTER, SINGLE OR UNSPECIFIED FETUS: ICD-10-CM

## 2023-08-19 LAB
ALBUMIN SERPL BCP-MCNC: 4 G/DL (ref 3.2–4.9)
ALBUMIN/GLOB SERPL: 1.2 G/DL
ALP SERPL-CCNC: 89 U/L (ref 30–99)
ALT SERPL-CCNC: 22 U/L (ref 2–50)
ANION GAP SERPL CALC-SCNC: 13 MMOL/L (ref 7–16)
APPEARANCE UR: CLEAR
AST SERPL-CCNC: 19 U/L (ref 12–45)
B-HCG SERPL-ACNC: ABNORMAL MIU/ML (ref 0–5)
BACTERIA #/AREA URNS HPF: NEGATIVE /HPF
BASOPHILS # BLD AUTO: 0.7 % (ref 0–1.8)
BASOPHILS # BLD: 0.06 K/UL (ref 0–0.12)
BILIRUB SERPL-MCNC: 0.3 MG/DL (ref 0.1–1.5)
BILIRUB UR QL STRIP.AUTO: NEGATIVE
BUN SERPL-MCNC: 7 MG/DL (ref 8–22)
CALCIUM ALBUM COR SERPL-MCNC: 9.4 MG/DL (ref 8.5–10.5)
CALCIUM SERPL-MCNC: 9.4 MG/DL (ref 8.5–10.5)
CHLORIDE SERPL-SCNC: 105 MMOL/L (ref 96–112)
CO2 SERPL-SCNC: 20 MMOL/L (ref 20–33)
COLOR UR: YELLOW
CREAT SERPL-MCNC: 0.52 MG/DL (ref 0.5–1.4)
EOSINOPHIL # BLD AUTO: 0.16 K/UL (ref 0–0.51)
EOSINOPHIL NFR BLD: 1.9 % (ref 0–6.9)
EPI CELLS #/AREA URNS HPF: NEGATIVE /HPF
ERYTHROCYTE [DISTWIDTH] IN BLOOD BY AUTOMATED COUNT: 42.7 FL (ref 35.9–50)
GFR SERPLBLD CREATININE-BSD FMLA CKD-EPI: 121 ML/MIN/1.73 M 2
GLOBULIN SER CALC-MCNC: 3.3 G/DL (ref 1.9–3.5)
GLUCOSE SERPL-MCNC: 114 MG/DL (ref 65–99)
GLUCOSE UR STRIP.AUTO-MCNC: NEGATIVE MG/DL
HCT VFR BLD AUTO: 43.4 % (ref 37–47)
HGB BLD-MCNC: 14.4 G/DL (ref 12–16)
HYALINE CASTS #/AREA URNS LPF: NORMAL /LPF
IMM GRANULOCYTES # BLD AUTO: 0.03 K/UL (ref 0–0.11)
IMM GRANULOCYTES NFR BLD AUTO: 0.4 % (ref 0–0.9)
KETONES UR STRIP.AUTO-MCNC: NEGATIVE MG/DL
LEUKOCYTE ESTERASE UR QL STRIP.AUTO: NEGATIVE
LIPASE SERPL-CCNC: 38 U/L (ref 11–82)
LYMPHOCYTES # BLD AUTO: 2.93 K/UL (ref 1–4.8)
LYMPHOCYTES NFR BLD: 34.8 % (ref 22–41)
MCH RBC QN AUTO: 27.9 PG (ref 27–33)
MCHC RBC AUTO-ENTMCNC: 33.2 G/DL (ref 32.2–35.5)
MCV RBC AUTO: 83.9 FL (ref 81.4–97.8)
MICRO URNS: ABNORMAL
MONOCYTES # BLD AUTO: 0.55 K/UL (ref 0–0.85)
MONOCYTES NFR BLD AUTO: 6.5 % (ref 0–13.4)
NEUTROPHILS # BLD AUTO: 4.7 K/UL (ref 1.82–7.42)
NEUTROPHILS NFR BLD: 55.7 % (ref 44–72)
NITRITE UR QL STRIP.AUTO: NEGATIVE
NRBC # BLD AUTO: 0 K/UL
NRBC BLD-RTO: 0 /100 WBC (ref 0–0.2)
NUMBER OF RH DOSES IND 8505RD: NORMAL
PH UR STRIP.AUTO: 6.5 [PH] (ref 5–8)
PLATELET # BLD AUTO: 262 K/UL (ref 164–446)
PMV BLD AUTO: 9.2 FL (ref 9–12.9)
POTASSIUM SERPL-SCNC: 4.1 MMOL/L (ref 3.6–5.5)
PROT SERPL-MCNC: 7.3 G/DL (ref 6–8.2)
PROT UR QL STRIP: NEGATIVE MG/DL
RBC # BLD AUTO: 5.17 M/UL (ref 4.2–5.4)
RBC # URNS HPF: NORMAL /HPF
RBC UR QL AUTO: ABNORMAL
RH BLD: NORMAL
SODIUM SERPL-SCNC: 138 MMOL/L (ref 135–145)
SP GR UR STRIP.AUTO: 1
UROBILINOGEN UR STRIP.AUTO-MCNC: 0.2 MG/DL
WBC # BLD AUTO: 8.4 K/UL (ref 4.8–10.8)
WBC #/AREA URNS HPF: NORMAL /HPF

## 2023-08-19 PROCEDURE — 86901 BLOOD TYPING SEROLOGIC RH(D): CPT

## 2023-08-19 PROCEDURE — 99284 EMERGENCY DEPT VISIT MOD MDM: CPT

## 2023-08-19 PROCEDURE — 85025 COMPLETE CBC W/AUTO DIFF WBC: CPT

## 2023-08-19 PROCEDURE — 36415 COLL VENOUS BLD VENIPUNCTURE: CPT

## 2023-08-19 PROCEDURE — 80053 COMPREHEN METABOLIC PANEL: CPT

## 2023-08-19 PROCEDURE — 81001 URINALYSIS AUTO W/SCOPE: CPT

## 2023-08-19 PROCEDURE — 76801 OB US < 14 WKS SINGLE FETUS: CPT

## 2023-08-19 PROCEDURE — 84702 CHORIONIC GONADOTROPIN TEST: CPT

## 2023-08-19 PROCEDURE — 83690 ASSAY OF LIPASE: CPT

## 2023-08-19 ASSESSMENT — FIBROSIS 4 INDEX: FIB4 SCORE: 0.52

## 2023-08-19 NOTE — ED PROVIDER NOTES
ER Provider Note    Scribed for Isaiah Massey M.d. by Sandra Weiner. 2023  3:07 PM    Primary Care Provider: Pcp Pt States None Noted    CHIEF COMPLAINT  Chief Complaint   Patient presents with    Vaginal Bleeding     Patient ambulates to triage reports blood clots starting an hour and a half ago, , currently 10wks pregnant. Patient reports passing one large blood clot. Reports light abdominal pain.      EXTERNAL RECORDS REVIEWED  Outpatient Notes Patient was admitted on 22 for pyelonephritis with sepsis.     HPI/ROS  LIMITATION TO HISTORY   Select: : None  OUTSIDE HISTORIAN(S):  Family who is present at bedside    Dulce Chow is a 38 y.o. female  who presents to the ED complaining of vaginal bleeding onset two hours ago. Patient is currently 10 weeks pregnant. She reports one large passing of a blood clot, she states she has not continued bleeding since. Her last menstrual cycle was May 22nd. She states her PCP diagnosed her with menopause and was told her periods were irregular so was unsure if this was actually her last menstrual cycle since it was light at that time. She states her first ultrasound was on  and has not had an OB visit since. She notes a follow up scheduled on 23. She has associated light abdominal pain. Patient denies nausea, vomiting or dysuria. No alleviating or exacerbating factors noted.     PAST MEDICAL HISTORY  History reviewed. No pertinent past medical history.    SURGICAL HISTORY  History reviewed. No pertinent surgical history.    FAMILY HISTORY  No pertinent family history.    SOCIAL HISTORY   reports that she has never smoked. She has never used smokeless tobacco. She reports that she does not drink alcohol and does not use drugs.    CURRENT MEDICATIONS  Previous Medications    LEVOTHYROXINE (SYNTHROID) 25 MCG TAB    Take 1 Tablet by mouth every morning on an empty stomach.     ALLERGIES  Patient has no known allergies.    PHYSICAL EXAM  BP  "132/87   Pulse 68   Temp 36.1 °C (97 °F) (Temporal)   Resp 16   Ht 1.575 m (5' 2\")   Wt 74.3 kg (163 lb 12.8 oz)   LMP 05/22/2023   SpO2 100%   BMI 29.96 kg/m²   Constitutional: Well developed, Well nourished, mild distress.   HENT: Normocephalic, Atraumatic.   Eyes: Conjunctiva normal, No discharge.   Cardiovascular: Normal heart rate, Normal rhythm, No murmurs, equal pulses.   Pulmonary: Normal breath sounds, No respiratory distress, No wheezing, No rales, No rhonchi.  Abdomen:Soft, , No masses, no rebound, no guarding. Mild suprapubic tenderness. No edema.   : Normal external genitalia, cervical os is closed, trace amount of blood in the vaginal canal.  Female chaperone present.  Back: No CVA tenderness.   Musculoskeletal: No major deformities noted, No tenderness.   Skin: Warm, Dry, No erythema, No rash.   Neurologic: Alert & oriented x 3, Normal motor function,  No focal deficits noted.   Psychiatric: Affect normal, Judgment normal, Mood normal.     DIAGNOSTIC STUDIES  Labs:   Results for orders placed or performed during the hospital encounter of 08/19/23   CBC WITH DIFFERENTIAL   Result Value Ref Range    WBC 8.4 4.8 - 10.8 K/uL    RBC 5.17 4.20 - 5.40 M/uL    Hemoglobin 14.4 12.0 - 16.0 g/dL    Hematocrit 43.4 37.0 - 47.0 %    MCV 83.9 81.4 - 97.8 fL    MCH 27.9 27.0 - 33.0 pg    MCHC 33.2 32.2 - 35.5 g/dL    RDW 42.7 35.9 - 50.0 fL    Platelet Count 262 164 - 446 K/uL    MPV 9.2 9.0 - 12.9 fL    Neutrophils-Polys 55.70 44.00 - 72.00 %    Lymphocytes 34.80 22.00 - 41.00 %    Monocytes 6.50 0.00 - 13.40 %    Eosinophils 1.90 0.00 - 6.90 %    Basophils 0.70 0.00 - 1.80 %    Immature Granulocytes 0.40 0.00 - 0.90 %    Nucleated RBC 0.00 0.00 - 0.20 /100 WBC    Neutrophils (Absolute) 4.70 1.82 - 7.42 K/uL    Lymphs (Absolute) 2.93 1.00 - 4.80 K/uL    Monos (Absolute) 0.55 0.00 - 0.85 K/uL    Eos (Absolute) 0.16 0.00 - 0.51 K/uL    Baso (Absolute) 0.06 0.00 - 0.12 K/uL    Immature Granulocytes (abs) " 0.03 0.00 - 0.11 K/uL    NRBC (Absolute) 0.00 K/uL   COMP METABOLIC PANEL   Result Value Ref Range    Sodium 138 135 - 145 mmol/L    Potassium 4.1 3.6 - 5.5 mmol/L    Chloride 105 96 - 112 mmol/L    Co2 20 20 - 33 mmol/L    Anion Gap 13.0 7.0 - 16.0    Glucose 114 (H) 65 - 99 mg/dL    Bun 7 (L) 8 - 22 mg/dL    Creatinine 0.52 0.50 - 1.40 mg/dL    Calcium 9.4 8.5 - 10.5 mg/dL    Correct Calcium 9.4 8.5 - 10.5 mg/dL    AST(SGOT) 19 12 - 45 U/L    ALT(SGPT) 22 2 - 50 U/L    Alkaline Phosphatase 89 30 - 99 U/L    Total Bilirubin 0.3 0.1 - 1.5 mg/dL    Albumin 4.0 3.2 - 4.9 g/dL    Total Protein 7.3 6.0 - 8.2 g/dL    Globulin 3.3 1.9 - 3.5 g/dL    A-G Ratio 1.2 g/dL   LIPASE   Result Value Ref Range    Lipase 38 11 - 82 U/L   HCG QUANTITATIVE   Result Value Ref Range    Bhcg 744058.0 (H) 0.0 - 5.0 mIU/mL   URINALYSIS,CULTURE IF INDICATED    Specimen: Urine   Result Value Ref Range    Color Yellow     Character Clear     Specific Gravity 1.004 <1.035    Ph 6.5 5.0 - 8.0    Glucose Negative Negative mg/dL    Ketones Negative Negative mg/dL    Protein Negative Negative mg/dL    Bilirubin Negative Negative    Urobilinogen, Urine 0.2 Negative    Nitrite Negative Negative    Leukocyte Esterase Negative Negative    Occult Blood Small (A) Negative    Micro Urine Req Microscopic    ESTIMATED GFR   Result Value Ref Range    GFR (CKD-EPI) 121 >60 mL/min/1.73 m 2   RH TYPE FOR RHOGAM FROM E.D.   Result Value Ref Range    Emergency Department Rh Typing POS     Number Of Rh Doses Indicated ZERO    URINE MICROSCOPIC (W/UA)   Result Value Ref Range    WBC 0-2 /hpf    RBC 0-2 /hpf    Bacteria Negative None /hpf    Epithelial Cells Negative /hpf    Hyaline Cast 0-2 /lpf       Radiology:   Radiologist interpretation:   US-OB 1ST TRIMESTER WITH TRANSVAGINAL (COMBO)   Final Result      1.  Single living intrauterine pregnancy at 10 weeks, 3 days estimated gestational age.      2.  Small (16 mm) implantational/subchorionic hemorrhage         COURSE & MEDICAL DECISION MAKING     ED Observation Status? no    INITIAL ASSESSMENT, COURSE AND PLAN  Care Narrative:     3:16 PM - Patient was seen and evaluated at bedside. Patient presents to the ED for vaginal bleeding onset 2 hours ago.  After my exam, I discussed with the patient the plan of care, which includes treating obtaining lab work and imaging for further evaluation. Patient understands and verbalizes agreement to plan of care. Ordered US-OB 1st trimester w/ transvaginal, CBC w/ diff, CMP, lipase, HCG quant and UA to evaluate. Differential diagnoses include but not limited to: threatened miscarriage vs miscarriage vs subchorionic hematoma     4:49 PM - Pelvic exam performed by me at this time with female chaperone. Normal external genitalia, cervical os was closed. Trace amount of blood shown.     5:10 PM - Patient was reevaluated at bedside. Discussed lab and radiology results with the patient and informed them . I then informed the patient of my plan for discharge, which includes strict return precautions for any new or worsening symptoms. Patient understands and verbalizes agreement to plan of care. Patient is comfortable going home at this time.     Problem #1 vaginal bleeding at this point time this appears to be likely secondary to small subchorionic hemorrhage.  Patient is not having active bleeding now and her cervical os is closed.  At this point time she already has an appointment in 6 days with her gynecologist I think she can just keep this and follow-up with them.  Discussed pelvic rest with the patient.      DISPOSITION AND DISCUSSIONS  I have discussed management of the patient with the following physicians and DEEPTI's:  None    Discussion of management with other QHP or appropriate source(s): None     Barriers to care at this time, including but not limited to: Patient does not have established PCP.       Patient will be discharged home.    FOLLOW UP:  [facility]  [address] [address]  [phone]  Schedule an appointment as soon as possible for a visit in 1 week  as scheduled    FINAL DIANGOSIS  1. Threatened miscarriage    2. Subchorionic hemorrhage of placenta in first trimester, single or unspecified fetus         The note accurately reflects work and decisions made by me.  Isaiah Massey M.D.  8/19/2023  5:56 PM

## 2023-08-19 NOTE — ED TRIAGE NOTES
"Chief Complaint   Patient presents with    Vaginal Bleeding     Patient ambulates to triage reports blood clots starting an hour and a half ago, , currently 10wks pregnant. Patient reports passing one large blood clot. Reports light abdominal pain.        /87   Pulse 68   Temp 36.1 °C (97 °F) (Temporal)   Resp 16   Ht 1.575 m (5' 2\")   Wt 74.3 kg (163 lb 12.8 oz)   SpO2 100%     Patient educated on ed triage process, instructed to notify staff of any new or worsening symptoms, verbalizes understanding. Patient returned to ed lobby, apologized for wait times.     "

## 2023-08-19 NOTE — DISCHARGE INSTRUCTIONS
No sex, tampon use, strenuous exercise or heavy lifting until cleared by gynecology.  Return the emergency department if you have heavy vaginal bleeding greater than than a pad every hour.

## 2023-08-20 NOTE — ED NOTES
Pt discharged, all appropriate hospital equipment removed (IV, monitor, pulse ox, etc.). Pt left unit via walking with family to vehicle for home. Personal belongings with pt when leaving unit. Pt given discharge instructions prior to leaving unit including where to  prescriptions and when to follow-up; verbalizes understanding. Pt informed to return to ED if symptoms worsen/return or altered status develop. Copy of discharge instructions signed and turned into DC basket and copy sent with pt. F/u with GYN, this RN able to translate with pt adequately in Vietnamese, no questions from pt or family

## 2023-08-25 ENCOUNTER — HOSPITAL ENCOUNTER (OUTPATIENT)
Facility: MEDICAL CENTER | Age: 38
End: 2023-08-25
Attending: PHYSICIAN ASSISTANT
Payer: COMMERCIAL

## 2023-08-25 ENCOUNTER — INITIAL PRENATAL (OUTPATIENT)
Dept: OBGYN | Facility: CLINIC | Age: 38
End: 2023-08-25

## 2023-08-25 VITALS — DIASTOLIC BLOOD PRESSURE: 58 MMHG | SYSTOLIC BLOOD PRESSURE: 100 MMHG | BODY MASS INDEX: 29.63 KG/M2 | WEIGHT: 162 LBS

## 2023-08-25 DIAGNOSIS — O99.281 HYPOTHYROIDISM AFFECTING PREGNANCY IN FIRST TRIMESTER: ICD-10-CM

## 2023-08-25 DIAGNOSIS — O09.891 HISTORY OF PRETERM DELIVERY, CURRENTLY PREGNANT IN FIRST TRIMESTER: ICD-10-CM

## 2023-08-25 DIAGNOSIS — O09.521 MULTIGRAVIDA OF ADVANCED MATERNAL AGE IN FIRST TRIMESTER: ICD-10-CM

## 2023-08-25 DIAGNOSIS — E03.9 HYPOTHYROIDISM AFFECTING PREGNANCY IN FIRST TRIMESTER: ICD-10-CM

## 2023-08-25 LAB
ABO GROUP BLD: NORMAL
BLD GP AB SCN SERPL QL: NORMAL
ERYTHROCYTE [DISTWIDTH] IN BLOOD BY AUTOMATED COUNT: 43.8 FL (ref 35.9–50)
HBV SURFACE AG SER QL: NORMAL
HCT VFR BLD AUTO: 42.4 % (ref 37–47)
HCV AB SER QL: NORMAL
HGB BLD-MCNC: 13.7 G/DL (ref 12–16)
HIV 1+2 AB+HIV1 P24 AG SERPL QL IA: NORMAL
MCH RBC QN AUTO: 27.7 PG (ref 27–33)
MCHC RBC AUTO-ENTMCNC: 32.3 G/DL (ref 32.2–35.5)
MCV RBC AUTO: 85.8 FL (ref 81.4–97.8)
PLATELET # BLD AUTO: 256 K/UL (ref 164–446)
PMV BLD AUTO: 10.1 FL (ref 9–12.9)
RBC # BLD AUTO: 4.94 M/UL (ref 4.2–5.4)
RH BLD: NORMAL
RUBV AB SER QL: 286 IU/ML
T PALLIDUM AB SER QL IA: NORMAL
T4 FREE SERPL-MCNC: 1.08 NG/DL (ref 0.93–1.7)
TSH SERPL DL<=0.005 MIU/L-ACNC: 2.04 UIU/ML (ref 0.38–5.33)
WBC # BLD AUTO: 10.5 K/UL (ref 4.8–10.8)

## 2023-08-25 PROCEDURE — 0500F INITIAL PRENATAL CARE VISIT: CPT | Performed by: PHYSICIAN ASSISTANT

## 2023-08-25 PROCEDURE — 3074F SYST BP LT 130 MM HG: CPT | Performed by: PHYSICIAN ASSISTANT

## 2023-08-25 PROCEDURE — 3078F DIAST BP <80 MM HG: CPT | Performed by: PHYSICIAN ASSISTANT

## 2023-08-25 ASSESSMENT — FIBROSIS 4 INDEX: FIB4 SCORE: 0.6

## 2023-08-25 ASSESSMENT — EDINBURGH POSTNATAL DEPRESSION SCALE (EPDS)
I HAVE BEEN ABLE TO LAUGH AND SEE THE FUNNY SIDE OF THINGS: AS MUCH AS I ALWAYS COULD
THE THOUGHT OF HARMING MYSELF HAS OCCURRED TO ME: NEVER
I HAVE FELT SCARED OR PANICKY FOR NO GOOD REASON: NO, NOT AT ALL
I HAVE BEEN ANXIOUS OR WORRIED FOR NO GOOD REASON: HARDLY EVER
TOTAL SCORE: 2
I HAVE BEEN SO UNHAPPY THAT I HAVE HAD DIFFICULTY SLEEPING: NOT AT ALL
THINGS HAVE BEEN GETTING ON TOP OF ME: NO, MOST OF THE TIME I HAVE COPED QUITE WELL
I HAVE FELT SAD OR MISERABLE: NO, NOT AT ALL
I HAVE BLAMED MYSELF UNNECESSARILY WHEN THINGS WENT WRONG: NO, NEVER
I HAVE BEEN SO UNHAPPY THAT I HAVE BEEN CRYING: NO, NEVER
I HAVE LOOKED FORWARD WITH ENJOYMENT TO THINGS: AS MUCH AS I EVER DID

## 2023-08-25 ASSESSMENT — ENCOUNTER SYMPTOMS
CONSTIPATION: 1
NAUSEA: 1

## 2023-08-25 NOTE — PROGRESS NOTES
Subjective     Dulce Chow is a 38 y.o. female who presents with New ob visit. Pt is sure of irregular LMP. Dating is by 7wk and 10 wk US.   OBHx: First pregnancy was  at 36 wk after PROM in , denies GDM, PIH or delivery complications. Pt was shocked with pregnancy, thought she was in menopause.  PMHx: Hypothyroid - last tested 6 mo ago  SHX: Denies, though cyst removed from R breast  Allergies: NKDA  Social: Denies tob, etoh or drug use   Meds; Taking PNV and Synthroid 25mcg  Pt currently denies cramping, bleeding or pain though pt seen in ER last week with passage of clot only, now just mild brown spotting only. No cramping. No FM.             HPI    Review of Systems   Gastrointestinal:  Positive for constipation and nausea.   All other systems reviewed and are negative.             Objective     /58   Wt 162 lb   LMP 2023 (Approximate)   BMI 29.63 kg/m²      Physical Exam  Vitals reviewed.   Constitutional:       Appearance: She is well-developed.   HENT:      Head: Normocephalic and atraumatic.   Eyes:      Pupils: Pupils are equal, round, and reactive to light.   Neck:      Thyroid: No thyromegaly.   Cardiovascular:      Rate and Rhythm: Normal rate and regular rhythm.      Heart sounds: Normal heart sounds.   Pulmonary:      Effort: Pulmonary effort is normal. No respiratory distress.      Breath sounds: Normal breath sounds.   Abdominal:      General: Bowel sounds are normal. There is no distension.      Palpations: Abdomen is soft.      Tenderness: There is no abdominal tenderness.   Genitourinary:     Exam position: Supine.      Labia:         Right: No rash or tenderness.         Left: No rash or tenderness.       Vagina: Normal. No signs of injury and foreign body. No vaginal discharge or erythema.      Cervix: No cervical motion tenderness.      Uterus: Not deviated, not enlarged and not tender.       Adnexa:         Right: No mass or tenderness.          Left: No mass or  tenderness.     Musculoskeletal:      Cervical back: Normal range of motion and neck supple.   Skin:     General: Skin is warm and dry.      Findings: No erythema.   Neurological:      Mental Status: She is alert.      Deep Tendon Reflexes: Reflexes are normal and symmetric.   Psychiatric:         Behavior: Behavior normal.         Thought Content: Thought content normal.                             Assessment & Plan        1. Multigravida of advanced maternal age in first trimester  - F/u 4 wk, US 9-10 wks  - Declines genetic testing or MFM referral  - PREG CNTR PRENATAL PN; Future  - URINE DRUG SCREEN W/CONF (AR); Future  - US-OB 2ND 3RD TRI COMPLETE; Future  - THINPREP PAP W/HPV AND CTNG; Future    2. History of PTD at 36wk with PROM  - Risks d/w pt    3. Hypothyroidism affecting pregnancy in first trimester  - TSH+FREE T4

## 2023-08-25 NOTE — PROGRESS NOTES
NOB today. US done 8/4/23  ER ultrasound 8/19/23 due to vag bleeding  Some spotting. Dark brown  LMP:5/22/23  Last pap: not sure. Needs one today  Phone # [phone]  Pharmacy confirmed  On PNV  Not interested in Genetic screening   c/o some cramping and also constipation

## 2023-08-27 LAB
AMPHET CTO UR CFM-MCNC: NEGATIVE NG/ML
BACTERIA UR CULT: NORMAL
BARBITURATES CTO UR CFM-MCNC: NEGATIVE NG/ML
BENZODIAZ CTO UR CFM-MCNC: NEGATIVE NG/ML
CANNABINOIDS CTO UR CFM-MCNC: NEGATIVE NG/ML
COCAINE CTO UR CFM-MCNC: NEGATIVE NG/ML
DRUG COMMENT 753798: NORMAL
METHADONE CTO UR CFM-MCNC: NEGATIVE NG/ML
OPIATES CTO UR CFM-MCNC: NEGATIVE NG/ML
PCP CTO UR CFM-MCNC: NEGATIVE NG/ML
PROPOXYPH CTO UR CFM-MCNC: NEGATIVE NG/ML
SIGNIFICANT IND 70042: NORMAL
SITE SITE: NORMAL
SOURCE SOURCE: NORMAL

## 2023-08-29 ENCOUNTER — TELEPHONE (OUTPATIENT)
Dept: OBGYN | Facility: CLINIC | Age: 38
End: 2023-08-29

## 2023-08-29 NOTE — TELEPHONE ENCOUNTER
Pt called in stating that she is pregnant and has been having some spotting and some light cramping. Pt states that she is only spotting when she uses the bathroom.Offered pt appt for tomorrow at 12:15 pt states that she will be there. Gave pt bleeding and pain precautions and pt knows to go to hospital if bleeding or cramping become worse.

## 2023-08-30 ENCOUNTER — APPOINTMENT (OUTPATIENT)
Dept: OBGYN | Facility: CLINIC | Age: 38
End: 2023-08-30

## 2023-08-30 LAB
C TRACH RRNA CVX QL NAA+PROBE: NEGATIVE
CYTOLOGIST CVX/VAG CYTO: NORMAL
CYTOLOGY CVX/VAG DOC CYTO: NORMAL
CYTOLOGY CVX/VAG DOC THIN PREP: NORMAL
HPV I/H RISK 4 DNA CVX QL PROBE+SIG AMP: NEGATIVE
HPV REFLEX NL1174300: NORMAL
N GONORRHOEA RRNA CVX QL NAA+PROBE: NEGATIVE
NOTE NL11727A: NORMAL
OTHER STN SPEC: NORMAL
STAT OF ADQ CVX/VAG CYTO-IMP: NORMAL

## 2023-08-31 ENCOUNTER — ROUTINE PRENATAL (OUTPATIENT)
Dept: OBGYN | Facility: CLINIC | Age: 38
End: 2023-08-31

## 2023-08-31 ENCOUNTER — HOSPITAL ENCOUNTER (OUTPATIENT)
Dept: LAB | Facility: MEDICAL CENTER | Age: 38
End: 2023-08-31
Attending: NURSE PRACTITIONER
Payer: COMMERCIAL

## 2023-08-31 VITALS — BODY MASS INDEX: 29.26 KG/M2 | WEIGHT: 160 LBS | DIASTOLIC BLOOD PRESSURE: 52 MMHG | SYSTOLIC BLOOD PRESSURE: 100 MMHG

## 2023-08-31 DIAGNOSIS — O09.521 MULTIGRAVIDA OF ADVANCED MATERNAL AGE IN FIRST TRIMESTER: ICD-10-CM

## 2023-08-31 DIAGNOSIS — O20.9 VAGINAL BLEEDING AFFECTING EARLY PREGNANCY: ICD-10-CM

## 2023-08-31 PROCEDURE — 3078F DIAST BP <80 MM HG: CPT | Performed by: NURSE PRACTITIONER

## 2023-08-31 PROCEDURE — 3074F SYST BP LT 130 MM HG: CPT | Performed by: NURSE PRACTITIONER

## 2023-08-31 PROCEDURE — 0502F SUBSEQUENT PRENATAL CARE: CPT | Performed by: NURSE PRACTITIONER

## 2023-08-31 ASSESSMENT — FIBROSIS 4 INDEX: FIB4 SCORE: 0.62

## 2023-08-31 NOTE — PROGRESS NOTES
S: Pt is  at 11w4d here for fit in visit d/t bleeding when she pees.  She was seen in ED on  for same. Denies, LOF,  RUCs or vaginal DC. She reports small cramping.    O:  See above for vitals        PNLs wnl        FHTs in 160s via doppler    A: IUP 11w4d  Patient Active Problem List    Diagnosis Date Noted    Vaginal bleeding affecting early pregnancy 2023    History of PTD at 36wk with PROM - 2023    Hypothyroidism affecting pregnancy in first trimester 2023    Advanced maternal age in multigravida 2023     We discussed possibility of subchorionic hemorrhage d/t implantation which is a variation of normal first trimester pregnancy and will resolve itself, or possibility of 1st trimester miscarriage. Pt reassured b presence of FHTs    P:  Urine culture ordered       Pt to keep DAQUAN on        Pt understands she is to go to ED if bleeding worsens or having CTX or severe cramping.

## 2023-08-31 NOTE — PROGRESS NOTES
OB follow up   No fetal movement yet.  No VB, LOF or UC's.  Phone # [phone]  Preferred pharmacy confirmed.  PNP done  US scheduled for 11/2/23  C/o spotting for 2 days. No recent intercourse. Denies any UTI symptoms. Light cramping.

## 2023-09-02 LAB
BACTERIA UR CULT: NORMAL
SIGNIFICANT IND 70042: NORMAL
SITE SITE: NORMAL
SOURCE SOURCE: NORMAL

## 2023-09-05 ENCOUNTER — APPOINTMENT (OUTPATIENT)
Dept: OBGYN | Facility: CLINIC | Age: 38
End: 2023-09-05
Payer: MEDICAID

## 2023-09-21 NOTE — PROGRESS NOTES
"S:   Dulce is a 38 y.o.  at 14w5d. Her CHRIS is 3/17/2024, by Ultrasound. She is here for routine OB follow up.  Reports positive FM.  Denies VB, LOF, RUCs or vaginal DC. No questions or concerns. Feels well overall.    Current Outpatient Medications on File Prior to Visit   Medication Sig Dispense Refill    PRENATAL VIT-DOCUSATE-IRON-FA PO Take  by mouth.      levothyroxine (SYNTHROID) 25 MCG Tab Take 1 Tablet by mouth every morning on an empty stomach.       No current facility-administered medications on file prior to visit.        O:    Vitals:    23 1545   BP: 104/62   Weight: 165 lb       FHT: 151 BPM    See flow sheet.    A:   IUP 14w5d  S=D  Patient Active Problem List    Diagnosis Date Noted    Vaginal bleeding affecting early pregnancy 2023    History of PTD at 36wk with PROM - 2023    Hypothyroidism affecting pregnancy in first trimester 2023    Advanced maternal age in multigravida 2023       P:   US is scheduled for 23.  Questions answered. Anticipatory guidance.   Encouraged adequate water intake.  Reviewed ED return precautions - verbalized understanding  F/u 4 wks and PRN    No orders of the defined types were placed in this encounter.       Pregnancy Checklist  Prenatal labs: PNL WNL  EPDS: not completed at NOB  Last Pap: 23: NILM, HPV neg  Quad screen/NIPT/MASFP/Carrier Screen: declined 23  Anatomy US: scheduled for 23  3rd trimester labs:  Tdap:  Flu vaccine: not available at this time  Rhogam: N/A A+  PP Contraception plan:  Bilateral salpingectomy:  GBS:   Hx C/S: no  IOL plan:    uDlce Bates, C.N.M.    Ipad  utilized: \"Jun\" #[id number]    "

## 2023-09-22 ENCOUNTER — ROUTINE PRENATAL (OUTPATIENT)
Dept: OBGYN | Facility: CLINIC | Age: 38
End: 2023-09-22

## 2023-09-22 VITALS — BODY MASS INDEX: 30.18 KG/M2 | DIASTOLIC BLOOD PRESSURE: 62 MMHG | SYSTOLIC BLOOD PRESSURE: 104 MMHG | WEIGHT: 165 LBS

## 2023-09-22 DIAGNOSIS — Z34.82 ENCOUNTER FOR SUPERVISION OF OTHER NORMAL PREGNANCY IN SECOND TRIMESTER: ICD-10-CM

## 2023-09-22 PROCEDURE — 3074F SYST BP LT 130 MM HG: CPT

## 2023-09-22 PROCEDURE — 0502F SUBSEQUENT PRENATAL CARE: CPT

## 2023-09-22 PROCEDURE — 3078F DIAST BP <80 MM HG: CPT

## 2023-09-22 ASSESSMENT — FIBROSIS 4 INDEX: FIB4 SCORE: 0.62

## 2023-10-18 ENCOUNTER — ROUTINE PRENATAL (OUTPATIENT)
Dept: OBGYN | Facility: CLINIC | Age: 38
End: 2023-10-18
Payer: MEDICAID

## 2023-10-18 VITALS — SYSTOLIC BLOOD PRESSURE: 90 MMHG | BODY MASS INDEX: 30 KG/M2 | DIASTOLIC BLOOD PRESSURE: 58 MMHG | WEIGHT: 164 LBS

## 2023-10-18 DIAGNOSIS — O09.522 MULTIGRAVIDA OF ADVANCED MATERNAL AGE IN SECOND TRIMESTER: ICD-10-CM

## 2023-10-18 PROCEDURE — 3078F DIAST BP <80 MM HG: CPT | Performed by: NURSE PRACTITIONER

## 2023-10-18 PROCEDURE — 3074F SYST BP LT 130 MM HG: CPT | Performed by: NURSE PRACTITIONER

## 2023-10-18 PROCEDURE — 0502F SUBSEQUENT PRENATAL CARE: CPT | Performed by: NURSE PRACTITIONER

## 2023-10-18 RX ORDER — DOCUSATE SODIUM 100 MG/1
100 CAPSULE, LIQUID FILLED ORAL 2 TIMES DAILY
Qty: 60 CAPSULE | Refills: 2 | Status: ON HOLD | OUTPATIENT
Start: 2023-10-18 | End: 2024-02-09

## 2023-10-18 RX ORDER — FAMOTIDINE 20 MG/1
20 TABLET, FILM COATED ORAL 2 TIMES DAILY
Qty: 60 TABLET | Refills: 2 | Status: SHIPPED | OUTPATIENT
Start: 2023-10-18 | End: 2023-11-16

## 2023-10-18 ASSESSMENT — FIBROSIS 4 INDEX: FIB4 SCORE: 0.62

## 2023-10-18 NOTE — PROGRESS NOTES
SUBJECTIVE:  Pt is a 38 y.o.   at 18w3d  gestation. Presents today for follow-up prenatal care. Reports  fetal movement. Denies regular cramping/contractions, bleeding or leaking of fluid. Denies dysuria, headaches, N/V. Generally feels well today except some heartburn, constipation and headache about once a month.      OBJECTIVE:  - See prenatal vitals flow  -   Vitals:    10/18/23 0846   BP: 90/58   Weight: 164 lb                 ASSESSMENT:   - IUP at 18w3d    - S=D   -   Patient Active Problem List    Diagnosis Date Noted    Vaginal bleeding affecting early pregnancy 2023    History of PTD at 36wk with PROM - 2023    Hypothyroidism affecting pregnancy in first trimester 2023    Advanced maternal age in multigravida 2023         PLAN:  - S/sx pregnancy and labor warning signs vs general discomforts discussed  - Fetal movements and/or kick counts reviewed   - Adequate hydration reinforced  - Nutrition/exercise/vitamin education; continue PNV  - Declines genetic testing  - Declines flu vacc  - US scheduled  - Remedies for constipation, heartburn and headaches given    - Anticipatory guidance given  - RTC in 4 weeks for follow-up prenatal care

## 2023-10-18 NOTE — PROGRESS NOTES
Pt. Here for OB/FU.   Reports feeling FM.   Pt. Denies VB, LOF, or UC's.   Chaperone offered.   Labs up to date.   Flu declined.   Pt states she would like to know medications she can take for headaches.          ID : [id number]

## 2023-10-31 ENCOUNTER — APPOINTMENT (OUTPATIENT)
Dept: RADIOLOGY | Facility: IMAGING CENTER | Age: 38
End: 2023-10-31
Attending: PHYSICIAN ASSISTANT
Payer: MEDICAID

## 2023-10-31 DIAGNOSIS — O09.521 MULTIGRAVIDA OF ADVANCED MATERNAL AGE IN FIRST TRIMESTER: ICD-10-CM

## 2023-10-31 PROCEDURE — 76805 OB US >/= 14 WKS SNGL FETUS: CPT | Mod: TC | Performed by: PHYSICIAN ASSISTANT

## 2023-10-31 PROCEDURE — 76817 TRANSVAGINAL US OBSTETRIC: CPT | Mod: TC | Performed by: PHYSICIAN ASSISTANT

## 2023-11-03 DIAGNOSIS — O28.3 ABNORMAL PREGNANCY US: ICD-10-CM

## 2023-11-08 ENCOUNTER — TELEPHONE (OUTPATIENT)
Dept: OBGYN | Facility: CLINIC | Age: 38
End: 2023-11-08
Payer: MEDICAID

## 2023-11-08 NOTE — TELEPHONE ENCOUNTER
----- Message from SHYANN Meneses sent at 11/3/2023 11:12 AM PDT -----  Will f/u fetal heart in 4 wks as R ventricle smaller than normal.     11/8/2023 1113   ID: [id number]  Called pt and notified as above. Pt asked if that meant the whole heart was small? Informed pt that there are 4 parts to the heart, 1 part is smaller than normla.  Pt verbalized understanding.   Attempted to transfer to a PAR, but all were busy. After the call, gave information to ALAN Trinh to call pt and schedule US.

## 2023-11-15 ENCOUNTER — ROUTINE PRENATAL (OUTPATIENT)
Dept: OBGYN | Facility: CLINIC | Age: 38
End: 2023-11-15
Payer: MEDICAID

## 2023-11-15 VITALS — DIASTOLIC BLOOD PRESSURE: 60 MMHG | SYSTOLIC BLOOD PRESSURE: 110 MMHG | WEIGHT: 168.4 LBS | BODY MASS INDEX: 30.8 KG/M2

## 2023-11-15 DIAGNOSIS — O09.522 MULTIGRAVIDA OF ADVANCED MATERNAL AGE IN SECOND TRIMESTER: ICD-10-CM

## 2023-11-15 PROCEDURE — 3074F SYST BP LT 130 MM HG: CPT | Performed by: OBSTETRICS & GYNECOLOGY

## 2023-11-15 PROCEDURE — 3078F DIAST BP <80 MM HG: CPT | Performed by: OBSTETRICS & GYNECOLOGY

## 2023-11-15 PROCEDURE — 0502F SUBSEQUENT PRENATAL CARE: CPT | Performed by: OBSTETRICS & GYNECOLOGY

## 2023-11-15 ASSESSMENT — FIBROSIS 4 INDEX: FIB4 SCORE: 0.62

## 2023-11-16 RX ORDER — FAMOTIDINE 20 MG/1
TABLET, FILM COATED ORAL
Qty: 180 TABLET | Refills: 1 | Status: ON HOLD | OUTPATIENT
Start: 2023-11-16 | End: 2024-02-09

## 2023-12-07 ENCOUNTER — APPOINTMENT (OUTPATIENT)
Dept: RADIOLOGY | Facility: IMAGING CENTER | Age: 38
End: 2023-12-07
Attending: PHYSICIAN ASSISTANT
Payer: MEDICAID

## 2023-12-07 DIAGNOSIS — O28.3 ABNORMAL PREGNANCY US: ICD-10-CM

## 2023-12-07 PROCEDURE — 76816 OB US FOLLOW-UP PER FETUS: CPT | Mod: TC | Performed by: PHYSICIAN ASSISTANT

## 2023-12-13 ENCOUNTER — ANCILLARY PROCEDURE (OUTPATIENT)
Dept: MATERNAL FETAL MEDICINE | Facility: MEDICAL CENTER | Age: 38
End: 2023-12-13
Payer: MEDICAID

## 2023-12-13 VITALS — SYSTOLIC BLOOD PRESSURE: 115 MMHG | DIASTOLIC BLOOD PRESSURE: 78 MMHG | WEIGHT: 172.9 LBS | BODY MASS INDEX: 31.62 KG/M2

## 2023-12-13 DIAGNOSIS — O09.522 MULTIGRAVIDA OF ADVANCED MATERNAL AGE IN SECOND TRIMESTER: ICD-10-CM

## 2023-12-13 DIAGNOSIS — O09.219 PREVIOUS PRETERM DELIVERY, ANTEPARTUM: ICD-10-CM

## 2023-12-13 PROCEDURE — 76811 OB US DETAILED SNGL FETUS: CPT | Performed by: OBSTETRICS & GYNECOLOGY

## 2023-12-13 ASSESSMENT — FIBROSIS 4 INDEX: FIB4 SCORE: 0.62

## 2023-12-15 ENCOUNTER — ROUTINE PRENATAL (OUTPATIENT)
Dept: OBGYN | Facility: CLINIC | Age: 38
End: 2023-12-15
Payer: MEDICAID

## 2023-12-15 VITALS — DIASTOLIC BLOOD PRESSURE: 60 MMHG | WEIGHT: 176 LBS | BODY MASS INDEX: 32.19 KG/M2 | SYSTOLIC BLOOD PRESSURE: 110 MMHG

## 2023-12-15 DIAGNOSIS — O09.521 MULTIGRAVIDA OF ADVANCED MATERNAL AGE IN FIRST TRIMESTER: ICD-10-CM

## 2023-12-15 DIAGNOSIS — Z34.82 ENCOUNTER FOR SUPERVISION OF OTHER NORMAL PREGNANCY, SECOND TRIMESTER: Primary | ICD-10-CM

## 2023-12-15 DIAGNOSIS — E03.9 HYPOTHYROIDISM AFFECTING PREGNANCY IN FIRST TRIMESTER: ICD-10-CM

## 2023-12-15 DIAGNOSIS — O99.281 HYPOTHYROIDISM AFFECTING PREGNANCY IN FIRST TRIMESTER: ICD-10-CM

## 2023-12-15 PROCEDURE — 99999 PR NO CHARGE: CPT | Performed by: NURSE PRACTITIONER

## 2023-12-15 ASSESSMENT — FIBROSIS 4 INDEX: FIB4 SCORE: 0.62

## 2023-12-15 NOTE — PROGRESS NOTES
S: Feeling good movement.  NO concerns today.      O: Vitals see flows     Vitals:    12/15/23 1459   BP: 110/60          Pertinent Lab Results: 12/13/23 normal anatomy scan by MFM    A: Dulce Lutz IUP at 26w5d     Patient Active Problem List    Diagnosis Date Noted    Abnormal pregnancy US - R ventricle subjectively smaller than normal - f/u 4 wks 11/03/2023    Vaginal bleeding affecting early pregnancy 08/31/2023    History of PTD at 36wk with PROM - 2011 08/25/2023    Hypothyroidism affecting pregnancy in first trimester 08/25/2023    Advanced maternal age in multigravida 08/02/2023          P: Studies/Labs to order today: 3rd tri labs, recheck TSH   Studies/Labsfor next visit: n/a   Follow-up: 2 wks    Precautions reviewed with patient appropriate for gestational age.

## 2023-12-15 NOTE — PROGRESS NOTES
Pt. Here for OB/FU. Reports Good FM.   Good # [phone]  Pt states no complaints, pt would like someone to go over her U/S results with her.   Pt given 1 HR GTT, RPR and CBC lab slip today along with instructions.

## 2023-12-29 ENCOUNTER — HOSPITAL ENCOUNTER (OUTPATIENT)
Dept: LAB | Facility: MEDICAL CENTER | Age: 38
End: 2023-12-29
Attending: NURSE PRACTITIONER
Payer: MEDICAID

## 2023-12-29 DIAGNOSIS — Z34.82 ENCOUNTER FOR SUPERVISION OF OTHER NORMAL PREGNANCY, SECOND TRIMESTER: ICD-10-CM

## 2023-12-29 LAB
ERYTHROCYTE [DISTWIDTH] IN BLOOD BY AUTOMATED COUNT: 43.8 FL (ref 35.9–50)
GLUCOSE 1H P 50 G GLC PO SERPL-MCNC: 144 MG/DL (ref 70–139)
HCT VFR BLD AUTO: 40.4 % (ref 37–47)
HGB BLD-MCNC: 13.3 G/DL (ref 12–16)
MCH RBC QN AUTO: 28.9 PG (ref 27–33)
MCHC RBC AUTO-ENTMCNC: 32.9 G/DL (ref 32.2–35.5)
MCV RBC AUTO: 87.8 FL (ref 81.4–97.8)
PLATELET # BLD AUTO: 206 K/UL (ref 164–446)
PMV BLD AUTO: 10.2 FL (ref 9–12.9)
RBC # BLD AUTO: 4.6 M/UL (ref 4.2–5.4)
T PALLIDUM AB SER QL IA: NORMAL
T4 FREE SERPL-MCNC: 0.74 NG/DL (ref 0.93–1.7)
TSH SERPL DL<=0.005 MIU/L-ACNC: 1.35 UIU/ML (ref 0.38–5.33)
WBC # BLD AUTO: 8.6 K/UL (ref 4.8–10.8)

## 2023-12-29 PROCEDURE — 82950 GLUCOSE TEST: CPT

## 2023-12-29 PROCEDURE — 84439 ASSAY OF FREE THYROXINE: CPT

## 2023-12-29 PROCEDURE — 84443 ASSAY THYROID STIM HORMONE: CPT

## 2023-12-29 PROCEDURE — 86780 TREPONEMA PALLIDUM: CPT

## 2023-12-29 PROCEDURE — 36415 COLL VENOUS BLD VENIPUNCTURE: CPT

## 2023-12-29 PROCEDURE — 85027 COMPLETE CBC AUTOMATED: CPT

## 2023-12-30 DIAGNOSIS — R73.09 GTT (GLUCOSE TOLERANCE TEST) ABNORMAL: ICD-10-CM

## 2024-01-03 ENCOUNTER — TELEPHONE (OUTPATIENT)
Dept: OBGYN | Facility: CLINIC | Age: 39
End: 2024-01-03
Payer: MEDICAID

## 2024-01-03 NOTE — TELEPHONE ENCOUNTER
----- Message from Rubina Hauser C.N.M. sent at 12/30/2023 12:19 AM PST -----  Regarding: GTT  Guatemalan speaking.  Can you let her know did not pass glucose screening test.  I have ordered 3 hr.  Rubina Marie   ----- Message -----  From: Kelechi, Lab  Sent: 12/29/2023   1:59 PM PST  To: Rubina Hauser C.N.M.      Pt notified of abnormal 1hr gtt and need to do 3hr gtt this time. Pt instructed to fast 10-12 hrs prior to testing. Pt informed she is only allow to drink plain water during fasting time. Advised to bring a snack for after the test is done. Pt notified will be staying in the labs for the 3hr. Pt agreed to do it Friday 1/5/2024. Pt verbalized understanding.

## 2024-01-05 ENCOUNTER — ROUTINE PRENATAL (OUTPATIENT)
Dept: OBGYN | Facility: CLINIC | Age: 39
End: 2024-01-05
Payer: MEDICAID

## 2024-01-05 ENCOUNTER — HOSPITAL ENCOUNTER (OUTPATIENT)
Dept: LAB | Facility: MEDICAL CENTER | Age: 39
End: 2024-01-05
Attending: NURSE PRACTITIONER
Payer: MEDICAID

## 2024-01-05 ENCOUNTER — HOSPITAL ENCOUNTER (OUTPATIENT)
Facility: MEDICAL CENTER | Age: 39
End: 2024-01-05
Attending: NURSE PRACTITIONER
Payer: MEDICAID

## 2024-01-05 VITALS — DIASTOLIC BLOOD PRESSURE: 72 MMHG | WEIGHT: 178 LBS | SYSTOLIC BLOOD PRESSURE: 110 MMHG | BODY MASS INDEX: 32.56 KG/M2

## 2024-01-05 DIAGNOSIS — O28.3 ABNORMAL PREGNANCY US: ICD-10-CM

## 2024-01-05 DIAGNOSIS — R73.09 GTT (GLUCOSE TOLERANCE TEST) ABNORMAL: ICD-10-CM

## 2024-01-05 PROCEDURE — 82951 GLUCOSE TOLERANCE TEST (GTT): CPT

## 2024-01-05 PROCEDURE — 36415 COLL VENOUS BLD VENIPUNCTURE: CPT

## 2024-01-05 PROCEDURE — 82952 GTT-ADDED SAMPLES: CPT

## 2024-01-05 PROCEDURE — 0502F SUBSEQUENT PRENATAL CARE: CPT

## 2024-01-05 PROCEDURE — 3078F DIAST BP <80 MM HG: CPT

## 2024-01-05 PROCEDURE — 3074F SYST BP LT 130 MM HG: CPT

## 2024-01-05 ASSESSMENT — FIBROSIS 4 INDEX: FIB4 SCORE: 0.77

## 2024-01-05 NOTE — PROGRESS NOTES
OB follow up   + fetal movement.  No VB, LOF or UC's.  Phone # [phone]  Preferred pharmacy confirmed.  TRIPP GIVEN   BTL- declined   TDAP- declined   FLU- declined

## 2024-01-05 NOTE — LETTER
Cuente los Movimientos de [place] Beb[place]  Otro paso importante para la keyshawn de [place] beb[place]    Dulce Geraldo Pandeyo     [facility]            Dept: [phone]    ¿Cuántas semanas tiene de embarazo? 29w5d    Fecha cuando tiene que comenzar a contar el movimiento: HOY                  Marcela debe usar navjot diagrama    Michael manera en que [place] doctor puede controlar a keyshawn de [place] bebé es sabiendo cuantas veces se mueve [place] bebé en el útero, o por medio de las “pataditas”.  Usted podrá ayudarle a [place] médico al usar cada día el siguiente diagrama.    Cada día, usted debe prestar atención a cuantas horas le lleva a [place] bebé moverse 10 veces.  Comience a contar en la mañana, lo antes posible después de haberse levantado.    Primeramente, escriba la hora en que se mueve [place] beb[place], hasta llegar a 10 veces.  Colóquele un check o palomita a cada cuadrito cada vez que [place] bebé se mueva hasta que complete 10 veces.  Escriba la hora cuando termine de contar 10 veces en la última columna.  Sume el total del tiempo que le llevó contar los 10 movimientos.  Finalmente, complete el cuadrito de cuantas horas le llevó hacerlo.    Después de geovanna contado los 10 movimientos, ya no tendrá que contar los demás movimientos por el sukh del día.  A la mañana siguiente, comience a contar de nuevo cuantas veces se mueve el bebé desde el momento en que se levante.    ¿Qué tendría que considerarse un “movimiento”?  Es difícil de decirlo porque es distinto de michael madre a otra, y de un embarazo a otro.  Lo importante es que cuente el movimiento de la misma manera kaya el transcurso de [place] embarazo.  Si tiene preguntas adicionales, pregúntele a [place] doctor.    ¡Cuente cuidadosamente cada día!     MUESTRA:  Semana 28    ¿Cuántas horas le ha llevado sentir 10 movimientos?        Hora de Inicio     1     2     3     4     5     6     7     8     9     10   Hora de Finlizar   Michelle. 8:20           11:40   Mar.               Mié.                Jue.               Vie.               Sáb.               Dom.                 IMPORTANTE:  Usted debe contactar a [place] doctor si le lleva más de 2 horas sentir 10 movimientos de washing[place] bebé.    Cada mañana, escriba la hora de inicio y comience a contar los movimientos de washing[place] bebé.  Hágalo colocándole un check o palomita a cada cuadrito cada vez que sienta un movimiento de [place] bebé.  Cuando haya sentido 10 “pataditas”, escriba la hora en que terminó de contar en la última columna.  Luego, complete en la cajita (arriba de la curtis de check o palomita) el número total de horas que le llevó hacerlo.  Asegúrese de leer completamente las instrucciones en la página anterior.

## 2024-01-06 NOTE — PROGRESS NOTES
S: Pt is a 38 y.o.  at 29w5d gestation here today for routine prenatal care. Reports feeling well, though she did pass out during her glucose testing today. Pt reports fetal movement; denies cramping, vaginal bleeding, and leaking of fluid. Denies dysuria. Denies headache, visual changes, or epigastric pain.     She was seen by M for a cardiac abnormality and cleared per Dr Vides, he told them the heart seemed normal on US.    O: /72   Wt 178 lb    *see prenatal flowsheet*     Labs:        GCT: 144, 3hr completed today     A: IUP at 29w5d       S=D         Patient Active Problem List    Diagnosis Date Noted    Glucose tolerance test abnormal 2023    Abnormal pregnancy US - R ventricle subjectively smaller than normal - f/u 4 wks 2023    Vaginal bleeding affecting early pregnancy 2023    History of PTD at 36wk with PROM - 2023    Hypothyroidism affecting pregnancy in first trimester 2023    Advanced maternal age in multigravida 2023     P:   -Discussed normal s/sx pregnancy appropriate for gestational age and labor warning signs vs general discomforts. Reviewed fetal movements appropriate for EGA and kick counts. Reinforced adequate hydration and nutrition.  -3hr completed today, will notify of abnormal results  -Disc weekly NSTs at 36 weeks  -RTC in 2 weeks for routine prenatal care      Valencia Stewart C.N.M.

## 2024-01-09 ENCOUNTER — TELEPHONE (OUTPATIENT)
Dept: OBGYN | Facility: CLINIC | Age: 39
End: 2024-01-09
Payer: MEDICAID

## 2024-01-09 PROBLEM — O24.410 GDM (GESTATIONAL DIABETES MELLITUS), CLASS A1: Status: ACTIVE | Noted: 2024-01-09

## 2024-01-09 NOTE — TELEPHONE ENCOUNTER
----- Message from Rubina Hauser C.N.M. sent at 1/9/2024 12:08 PM PST -----  Mauritian speaking.  Please let her know she did not pass the 3 hour GTT.  She has gestational diabetes.  I don't remember how to order the diabetes class or if we just schedule them into it like we used to?? I will order diabetic supplies though.  Let me know if you need other orders.  Thanks, Rubina   ----- Message -----  From: Intf, Lab  Sent: 1/5/2024   5:54 PM PST  To: Rubina Hauser C.N.M.    Called pt and when giving results, this RN noticed ordered was no correct. Explained to pt thsi RN will call her back after consulting with provider. Pt agreed. Consulted with Valencia Stewart CNM and review labs and agreed pt does not meet criteria to be dx as GDM, but pt is glucose intolerance and provider stated pt can still take the GDM class for the meal plan part only , this will benefit pt. Lab was ordered as Glucose tolerance 3hr test, pt needed Glucose tolerance 3hr gestational. Called lab and send email to Neyda Finnegan and César to update order and results on pt's chart.  1635 called pt back and informed as discuss with provider above. Apologized to pt and Explained it will take a couple of day to reflect correct results on mychart. Pt agreed to take GDM class for the meal plan as suggested by provider. Pt scheduled and explained to pt she can chek in at 0900. Pt agreed and verbalized understanding.

## 2024-01-10 LAB
GLUCOSE 1H P CHAL SERPL-MCNC: 161 MG/DL (ref 65–180)
GLUCOSE 1H P CHAL SERPL-MCNC: NORMAL MG/DL (ref 65–199)
GLUCOSE 2H P CHAL SERPL-MCNC: 151 MG/DL (ref 65–155)
GLUCOSE 2H P CHAL SERPL-MCNC: NORMAL MG/DL (ref 65–139)
GLUCOSE 3H P CHAL SERPL-MCNC: 151 MG/DL (ref 65–140)
GLUCOSE 3H P CHAL SERPL-MCNC: NORMAL MG/DL (ref 65–109)
GLUCOSE BS SERPL-MCNC: 80 MG/DL (ref 65–95)
GLUCOSE BS SERPL-MCNC: NORMAL MG/DL (ref 65–99)

## 2024-01-16 ENCOUNTER — NON-PROVIDER VISIT (OUTPATIENT)
Dept: OBGYN | Facility: CLINIC | Age: 39
End: 2024-01-16
Payer: MEDICAID

## 2024-01-16 VITALS
WEIGHT: 179 LBS | BODY MASS INDEX: 32.74 KG/M2 | HEART RATE: 75 BPM | DIASTOLIC BLOOD PRESSURE: 82 MMHG | SYSTOLIC BLOOD PRESSURE: 127 MMHG

## 2024-01-16 ASSESSMENT — FIBROSIS 4 INDEX: FIB4 SCORE: 0.77

## 2024-01-16 NOTE — PROGRESS NOTES
I called Terri and RAHUL [facility] in [place].    Pt here for meal plan for glucose intolerance per provider. Discuss with pt sources of simple sugars, sources of complex carbs, Carb portions, Protains and fats, plate distribution.  The pt received a 2000 calorie meal plan (with verification of Priscila's MIGUEL ANGEL CDE/RN) consisting of 3 meals and 3 snacks (see media for copy of meal plan).   Recommended meal times:   B: 0800  S: 1951-5588  L: 4800-7015  S: 1600  D: 1900  S: 2200  Pt was educated on carbohydrate containing foods vs non carbohydrate containing foods, the importance of small frequent meals, limiting carbohydrate to 1 serving in the morning, no fruit before noon/12pm, and avoiding all concentrated sweets for the remainder of her pregnancy. Explained the importance of not going >3hrs btwn eating during the day and no longer than 10hours overnight. Patient agrees to follow the meal plan and guidelines provided.   All handouts were given in Qatari.

## 2024-01-19 ENCOUNTER — ROUTINE PRENATAL (OUTPATIENT)
Dept: OBGYN | Facility: CLINIC | Age: 39
End: 2024-01-19
Payer: MEDICAID

## 2024-01-19 VITALS — WEIGHT: 179 LBS | SYSTOLIC BLOOD PRESSURE: 120 MMHG | DIASTOLIC BLOOD PRESSURE: 78 MMHG | BODY MASS INDEX: 32.74 KG/M2

## 2024-01-19 DIAGNOSIS — O09.93 ENCOUNTER FOR SUPERVISION OF HIGH RISK PREGNANCY IN THIRD TRIMESTER, ANTEPARTUM: Primary | ICD-10-CM

## 2024-01-19 PROCEDURE — 3078F DIAST BP <80 MM HG: CPT | Performed by: NURSE PRACTITIONER

## 2024-01-19 PROCEDURE — 3074F SYST BP LT 130 MM HG: CPT | Performed by: NURSE PRACTITIONER

## 2024-01-19 PROCEDURE — 0502F SUBSEQUENT PRENATAL CARE: CPT | Performed by: NURSE PRACTITIONER

## 2024-01-19 RX ORDER — LEVOTHYROXINE SODIUM 0.05 MG/1
50 TABLET ORAL
Qty: 30 TABLET | Refills: 5 | Status: SHIPPED | OUTPATIENT
Start: 2024-01-19

## 2024-01-19 ASSESSMENT — FIBROSIS 4 INDEX: FIB4 SCORE: 0.77

## 2024-01-19 NOTE — PROGRESS NOTES
S:  Pt is  at 31w5d for routine OB follow up.  Reports occas dizziness. No ED or hospital visits since last seen. Reports good FM.  Denies VB, LOF, RUCs or vaginal DC.  Pt recd nutritional counseling for her insulin resistance through clinic diabetic program. States she is eating better.    O:  Please see above vitals.        2024 labs:         TSH 1.35 wnl         Free T4 .74 low-reviewed w pt and need to increase levothyroxine dose                  A:  IUP at 31w5d  Patient Active Problem List    Diagnosis Date Noted    Encounter for supervision of high risk pregnancy in third trimester, antepartum 2024    GDM (gestational diabetes mellitus), class A1 2024    Glucose tolerance test abnormal 2023    Abnormal pregnancy US - R ventricle subjectively smaller than normal - f/u 4 wks 2023    Vaginal bleeding affecting early pregnancy 2023    History of PTD at 36wk with PROM - 2023    Hypothyroidism affecting pregnancy in first trimester 2023    Advanced maternal age in multigravida 2023        P:  1.  GBS @ 36 wks.          2.  Continue FKCs.          3.  Questions answered.          4.  Encouraged pt to tour L&D.          5.  Encourage adequate water intake.        6.  Discussed childbirth education, discussed carseat safety, WIC information given        7.  F/u 2 wks.        8.  Rx for levothyroxine 50 mcg daily, will recheck labs at 36 wks

## 2024-01-19 NOTE — PROGRESS NOTES
OB follow up   + fetal movement.  No VB, LOF or UC's.  Phone # [phone]  Preferred pharmacy confirmed.       Private car

## 2024-01-25 ENCOUNTER — APPOINTMENT (OUTPATIENT)
Dept: OBGYN | Facility: CLINIC | Age: 39
End: 2024-01-25
Payer: MEDICAID

## 2024-02-02 ENCOUNTER — ROUTINE PRENATAL (OUTPATIENT)
Dept: OBGYN | Facility: CLINIC | Age: 39
End: 2024-02-02
Payer: MEDICAID

## 2024-02-02 ENCOUNTER — HOSPITAL ENCOUNTER (OUTPATIENT)
Dept: LAB | Facility: MEDICAL CENTER | Age: 39
End: 2024-02-02
Attending: PHYSICIAN ASSISTANT
Payer: MEDICAID

## 2024-02-02 VITALS — DIASTOLIC BLOOD PRESSURE: 90 MMHG | BODY MASS INDEX: 33.95 KG/M2 | WEIGHT: 185.6 LBS | SYSTOLIC BLOOD PRESSURE: 140 MMHG

## 2024-02-02 DIAGNOSIS — O16.3 ELEVATED BLOOD PRESSURE AFFECTING PREGNANCY IN THIRD TRIMESTER, ANTEPARTUM: ICD-10-CM

## 2024-02-02 DIAGNOSIS — M79.89 SWELLING OF LOWER EXTREMITY: ICD-10-CM

## 2024-02-02 DIAGNOSIS — O09.523 MULTIGRAVIDA OF ADVANCED MATERNAL AGE IN THIRD TRIMESTER: ICD-10-CM

## 2024-02-02 PROBLEM — R73.09 GLUCOSE TOLERANCE TEST ABNORMAL: Status: RESOLVED | Noted: 2023-12-30 | Resolved: 2024-02-02

## 2024-02-02 PROBLEM — O24.410 GDM (GESTATIONAL DIABETES MELLITUS), CLASS A1: Status: RESOLVED | Noted: 2024-01-09 | Resolved: 2024-02-02

## 2024-02-02 PROBLEM — O20.9 VAGINAL BLEEDING AFFECTING EARLY PREGNANCY: Status: RESOLVED | Noted: 2023-08-31 | Resolved: 2024-02-02

## 2024-02-02 LAB
ALBUMIN SERPL BCP-MCNC: 2.9 G/DL (ref 3.2–4.9)
ALBUMIN/GLOB SERPL: 0.9 G/DL
ALP SERPL-CCNC: 211 U/L (ref 30–99)
ALT SERPL-CCNC: 11 U/L (ref 2–50)
ANION GAP SERPL CALC-SCNC: 14 MMOL/L (ref 7–16)
APPEARANCE UR: NORMAL
AST SERPL-CCNC: 20 U/L (ref 12–45)
BILIRUB SERPL-MCNC: 0.3 MG/DL (ref 0.1–1.5)
BILIRUB UR STRIP-MCNC: NORMAL MG/DL
BUN SERPL-MCNC: 11 MG/DL (ref 8–22)
CALCIUM ALBUM COR SERPL-MCNC: 9.7 MG/DL (ref 8.5–10.5)
CALCIUM SERPL-MCNC: 8.8 MG/DL (ref 8.5–10.5)
CHLORIDE SERPL-SCNC: 105 MMOL/L (ref 96–112)
CO2 SERPL-SCNC: 17 MMOL/L (ref 20–33)
COLOR UR AUTO: NORMAL
CREAT SERPL-MCNC: 0.65 MG/DL (ref 0.5–1.4)
CREAT UR-MCNC: 30.51 MG/DL
ERYTHROCYTE [DISTWIDTH] IN BLOOD BY AUTOMATED COUNT: 43.1 FL (ref 35.9–50)
FASTING STATUS PATIENT QL REPORTED: NORMAL
GFR SERPLBLD CREATININE-BSD FMLA CKD-EPI: 114 ML/MIN/1.73 M 2
GLOBULIN SER CALC-MCNC: 3.3 G/DL (ref 1.9–3.5)
GLUCOSE SERPL-MCNC: 113 MG/DL (ref 65–99)
GLUCOSE UR STRIP.AUTO-MCNC: NEGATIVE MG/DL
HCT VFR BLD AUTO: 41.2 % (ref 37–47)
HGB BLD-MCNC: 14.4 G/DL (ref 12–16)
KETONES UR STRIP.AUTO-MCNC: NORMAL MG/DL
LEUKOCYTE ESTERASE UR QL STRIP.AUTO: NEGATIVE
MCH RBC QN AUTO: 30.3 PG (ref 27–33)
MCHC RBC AUTO-ENTMCNC: 35 G/DL (ref 32.2–35.5)
MCV RBC AUTO: 86.6 FL (ref 81.4–97.8)
NITRITE UR QL STRIP.AUTO: NEGATIVE
PH UR STRIP.AUTO: 5.5 [PH] (ref 5–8)
PLATELET # BLD AUTO: 132 K/UL (ref 164–446)
PMV BLD AUTO: 11.7 FL (ref 9–12.9)
POTASSIUM SERPL-SCNC: 4 MMOL/L (ref 3.6–5.5)
PROT SERPL-MCNC: 6.2 G/DL (ref 6–8.2)
PROT UR QL STRIP: NORMAL MG/DL
PROT UR-MCNC: 15 MG/DL (ref 0–15)
PROT/CREAT UR: 492 MG/G (ref 10–107)
RBC # BLD AUTO: 4.76 M/UL (ref 4.2–5.4)
RBC UR QL AUTO: NORMAL
SODIUM SERPL-SCNC: 136 MMOL/L (ref 135–145)
SP GR UR STRIP.AUTO: 1.02
URATE SERPL-MCNC: 8.1 MG/DL (ref 1.9–8.2)
UROBILINOGEN UR STRIP-MCNC: 1 MG/DL
WBC # BLD AUTO: 7 K/UL (ref 4.8–10.8)

## 2024-02-02 PROCEDURE — 82570 ASSAY OF URINE CREATININE: CPT

## 2024-02-02 PROCEDURE — 3080F DIAST BP >= 90 MM HG: CPT | Performed by: PHYSICIAN ASSISTANT

## 2024-02-02 PROCEDURE — 85027 COMPLETE CBC AUTOMATED: CPT

## 2024-02-02 PROCEDURE — 0502F SUBSEQUENT PRENATAL CARE: CPT | Performed by: PHYSICIAN ASSISTANT

## 2024-02-02 PROCEDURE — 84550 ASSAY OF BLOOD/URIC ACID: CPT

## 2024-02-02 PROCEDURE — 84156 ASSAY OF PROTEIN URINE: CPT

## 2024-02-02 PROCEDURE — 80053 COMPREHEN METABOLIC PANEL: CPT

## 2024-02-02 PROCEDURE — 3077F SYST BP >= 140 MM HG: CPT | Performed by: PHYSICIAN ASSISTANT

## 2024-02-02 PROCEDURE — 81002 URINALYSIS NONAUTO W/O SCOPE: CPT | Performed by: PHYSICIAN ASSISTANT

## 2024-02-02 PROCEDURE — 36415 COLL VENOUS BLD VENIPUNCTURE: CPT

## 2024-02-02 ASSESSMENT — FIBROSIS 4 INDEX: FIB4 SCORE: 0.77

## 2024-02-02 NOTE — PROGRESS NOTES
Pt has no complaints with cramping, UCs, VB, LOF though pt has had incr pressure and occ d/c, though no itching, burning or odor. Pt also has had incr swelling in BLE x 2 weeks, also occ mild HA. Denies blurred vision or RUQ pain. BP slightly elevated from last visit. Repeat BP: 140/90. UA: 2+ protein, tr blood, tr ketones, sm bili. Will get PIH labs and P:C ratio asap, pt to f/u Monday for NST with serial BPs. PTL precautions reviewed. Declines RSV vax today. GBS at 36wk. RTC 1 wk or sooner prn and NST Monday.

## 2024-02-02 NOTE — PROGRESS NOTES
Pt. Here for OB/FU. Reports Good FM.   Good # [phone] (home)   Pt states having lower pelvic pain with fetal movement, and having some swelling.   RSV offered would like to read information.

## 2024-02-04 PROBLEM — O14.93 PRE-ECLAMPSIA IN THIRD TRIMESTER: Status: ACTIVE | Noted: 2024-02-04

## 2024-02-05 ENCOUNTER — OFFICE VISIT (OUTPATIENT)
Dept: OBGYN | Facility: CLINIC | Age: 39
End: 2024-02-05
Payer: MEDICAID

## 2024-02-05 ENCOUNTER — ANESTHESIA (OUTPATIENT)
Dept: OBGYN | Facility: MEDICAL CENTER | Age: 39
End: 2024-02-05
Payer: MEDICAID

## 2024-02-05 ENCOUNTER — ANESTHESIA EVENT (OUTPATIENT)
Dept: OBGYN | Facility: MEDICAL CENTER | Age: 39
End: 2024-02-05
Payer: MEDICAID

## 2024-02-05 ENCOUNTER — ANESTHESIA EVENT (OUTPATIENT)
Dept: ANESTHESIOLOGY | Facility: MEDICAL CENTER | Age: 39
End: 2024-02-05
Payer: MEDICAID

## 2024-02-05 ENCOUNTER — HOSPITAL ENCOUNTER (INPATIENT)
Facility: MEDICAL CENTER | Age: 39
LOS: 4 days | End: 2024-02-09
Attending: OBSTETRICS & GYNECOLOGY | Admitting: OBSTETRICS & GYNECOLOGY
Payer: MEDICAID

## 2024-02-05 DIAGNOSIS — O16.3 ELEVATED BLOOD PRESSURE AFFECTING PREGNANCY IN THIRD TRIMESTER, ANTEPARTUM: Primary | ICD-10-CM

## 2024-02-05 DIAGNOSIS — R10.9 POSTOPERATIVE ABDOMINAL PAIN: ICD-10-CM

## 2024-02-05 DIAGNOSIS — G89.18 POSTOPERATIVE ABDOMINAL PAIN: ICD-10-CM

## 2024-02-05 LAB
ABO GROUP BLD: NORMAL
ALBUMIN SERPL BCP-MCNC: 2.8 G/DL (ref 3.2–4.9)
ALBUMIN/GLOB SERPL: 0.8 G/DL
ALP SERPL-CCNC: 203 U/L (ref 30–99)
ALT SERPL-CCNC: 12 U/L (ref 2–50)
ANION GAP SERPL CALC-SCNC: 11 MMOL/L (ref 7–16)
AST SERPL-CCNC: 15 U/L (ref 12–45)
BASOPHILS # BLD AUTO: 0.3 % (ref 0–1.8)
BASOPHILS # BLD: 0.03 K/UL (ref 0–0.12)
BILIRUB SERPL-MCNC: 0.3 MG/DL (ref 0.1–1.5)
BLD GP AB SCN SERPL QL: NORMAL
BUN SERPL-MCNC: 15 MG/DL (ref 8–22)
CALCIUM ALBUM COR SERPL-MCNC: 9.9 MG/DL (ref 8.5–10.5)
CALCIUM SERPL-MCNC: 8.9 MG/DL (ref 8.5–10.5)
CHLORIDE SERPL-SCNC: 108 MMOL/L (ref 96–112)
CO2 SERPL-SCNC: 19 MMOL/L (ref 20–33)
CREAT SERPL-MCNC: 0.69 MG/DL (ref 0.5–1.4)
CREAT UR-MCNC: 16.79 MG/DL
EOSINOPHIL # BLD AUTO: 0.05 K/UL (ref 0–0.51)
EOSINOPHIL NFR BLD: 0.5 % (ref 0–6.9)
ERYTHROCYTE [DISTWIDTH] IN BLOOD BY AUTOMATED COUNT: 43.1 FL (ref 35.9–50)
GFR SERPLBLD CREATININE-BSD FMLA CKD-EPI: 113 ML/MIN/1.73 M 2
GLOBULIN SER CALC-MCNC: 3.3 G/DL (ref 1.9–3.5)
GLUCOSE SERPL-MCNC: 92 MG/DL (ref 65–99)
HCT VFR BLD AUTO: 42.4 % (ref 37–47)
HGB BLD-MCNC: 14.5 G/DL (ref 12–16)
HOLDING TUBE BB 8507: NORMAL
IMM GRANULOCYTES # BLD AUTO: 0.05 K/UL (ref 0–0.11)
IMM GRANULOCYTES NFR BLD AUTO: 0.5 % (ref 0–0.9)
LYMPHOCYTES # BLD AUTO: 2.59 K/UL (ref 1–4.8)
LYMPHOCYTES NFR BLD: 28.2 % (ref 22–41)
MAGNESIUM SERPL-MCNC: 2 MG/DL (ref 1.5–2.5)
MAGNESIUM SERPL-MCNC: 4.3 MG/DL (ref 1.5–2.5)
MAGNESIUM SERPL-MCNC: 6.2 MG/DL (ref 1.5–2.5)
MCH RBC QN AUTO: 29.9 PG (ref 27–33)
MCHC RBC AUTO-ENTMCNC: 34.2 G/DL (ref 32.2–35.5)
MCV RBC AUTO: 87.4 FL (ref 81.4–97.8)
MONOCYTES # BLD AUTO: 0.77 K/UL (ref 0–0.85)
MONOCYTES NFR BLD AUTO: 8.4 % (ref 0–13.4)
NEUTROPHILS # BLD AUTO: 5.71 K/UL (ref 1.82–7.42)
NEUTROPHILS NFR BLD: 62.1 % (ref 44–72)
NRBC # BLD AUTO: 0.02 K/UL
NRBC BLD-RTO: 0.2 /100 WBC (ref 0–0.2)
PATHOLOGY CONSULT NOTE: NORMAL
PLATELET # BLD AUTO: 132 K/UL (ref 164–446)
PMV BLD AUTO: 11.3 FL (ref 9–12.9)
POTASSIUM SERPL-SCNC: 4.1 MMOL/L (ref 3.6–5.5)
PROT SERPL-MCNC: 6.1 G/DL (ref 6–8.2)
PROT UR-MCNC: 144 MG/DL (ref 0–15)
PROT/CREAT UR: 8577 MG/G (ref 10–107)
RBC # BLD AUTO: 4.85 M/UL (ref 4.2–5.4)
RH BLD: NORMAL
SODIUM SERPL-SCNC: 138 MMOL/L (ref 135–145)
T PALLIDUM AB SER QL IA: NORMAL
WBC # BLD AUTO: 9.2 K/UL (ref 4.8–10.8)

## 2024-02-05 PROCEDURE — 700101 HCHG RX REV CODE 250: Performed by: ANESTHESIOLOGY

## 2024-02-05 PROCEDURE — 86780 TREPONEMA PALLIDUM: CPT

## 2024-02-05 PROCEDURE — 700102 HCHG RX REV CODE 250 W/ 637 OVERRIDE(OP): Performed by: OBSTETRICS & GYNECOLOGY

## 2024-02-05 PROCEDURE — C1755 CATHETER, INTRASPINAL: HCPCS | Performed by: OBSTETRICS & GYNECOLOGY

## 2024-02-05 PROCEDURE — 86901 BLOOD TYPING SEROLOGIC RH(D): CPT

## 2024-02-05 PROCEDURE — 85025 COMPLETE CBC W/AUTO DIFF WBC: CPT

## 2024-02-05 PROCEDURE — 700102 HCHG RX REV CODE 250 W/ 637 OVERRIDE(OP): Mod: UD | Performed by: STUDENT IN AN ORGANIZED HEALTH CARE EDUCATION/TRAINING PROGRAM

## 2024-02-05 PROCEDURE — 36415 COLL VENOUS BLD VENIPUNCTURE: CPT

## 2024-02-05 PROCEDURE — 59514 CESAREAN DELIVERY ONLY: CPT | Mod: 80 | Performed by: STUDENT IN AN ORGANIZED HEALTH CARE EDUCATION/TRAINING PROGRAM

## 2024-02-05 PROCEDURE — A9270 NON-COVERED ITEM OR SERVICE: HCPCS | Performed by: OBSTETRICS & GYNECOLOGY

## 2024-02-05 PROCEDURE — 160009 HCHG ANES TIME/MIN: Performed by: OBSTETRICS & GYNECOLOGY

## 2024-02-05 PROCEDURE — 86850 RBC ANTIBODY SCREEN: CPT

## 2024-02-05 PROCEDURE — 302790 HCHG STAT ANTEPARTUM CARE, DAILY

## 2024-02-05 PROCEDURE — A9270 NON-COVERED ITEM OR SERVICE: HCPCS | Mod: UD | Performed by: STUDENT IN AN ORGANIZED HEALTH CARE EDUCATION/TRAINING PROGRAM

## 2024-02-05 PROCEDURE — 80053 COMPREHEN METABOLIC PANEL: CPT

## 2024-02-05 PROCEDURE — 160002 HCHG RECOVERY MINUTES (STAT): Performed by: OBSTETRICS & GYNECOLOGY

## 2024-02-05 PROCEDURE — 700111 HCHG RX REV CODE 636 W/ 250 OVERRIDE (IP): Performed by: OBSTETRICS & GYNECOLOGY

## 2024-02-05 PROCEDURE — 83735 ASSAY OF MAGNESIUM: CPT

## 2024-02-05 PROCEDURE — 700111 HCHG RX REV CODE 636 W/ 250 OVERRIDE (IP): Performed by: ANESTHESIOLOGY

## 2024-02-05 PROCEDURE — 59510 CESAREAN DELIVERY: CPT | Performed by: OBSTETRICS & GYNECOLOGY

## 2024-02-05 PROCEDURE — 700102 HCHG RX REV CODE 250 W/ 637 OVERRIDE(OP): Performed by: ANESTHESIOLOGY

## 2024-02-05 PROCEDURE — 160041 HCHG SURGERY MINUTES - EA ADDL 1 MIN LEVEL 4: Performed by: OBSTETRICS & GYNECOLOGY

## 2024-02-05 PROCEDURE — 160048 HCHG OR STATISTICAL LEVEL 1-5: Performed by: OBSTETRICS & GYNECOLOGY

## 2024-02-05 PROCEDURE — 160029 HCHG SURGERY MINUTES - 1ST 30 MINS LEVEL 4: Performed by: OBSTETRICS & GYNECOLOGY

## 2024-02-05 PROCEDURE — 700105 HCHG RX REV CODE 258: Performed by: ANESTHESIOLOGY

## 2024-02-05 PROCEDURE — 88307 TISSUE EXAM BY PATHOLOGIST: CPT

## 2024-02-05 PROCEDURE — 700105 HCHG RX REV CODE 258: Performed by: OBSTETRICS & GYNECOLOGY

## 2024-02-05 PROCEDURE — 770002 HCHG ROOM/CARE - OB PRIVATE (112)

## 2024-02-05 PROCEDURE — 302449 STATCHG TRIAGE ONLY (STATISTIC)

## 2024-02-05 PROCEDURE — 160035 HCHG PACU - 1ST 60 MINS PHASE I: Performed by: OBSTETRICS & GYNECOLOGY

## 2024-02-05 PROCEDURE — 59025 FETAL NON-STRESS TEST: CPT | Performed by: OBSTETRICS & GYNECOLOGY

## 2024-02-05 PROCEDURE — 84156 ASSAY OF PROTEIN URINE: CPT

## 2024-02-05 PROCEDURE — A9270 NON-COVERED ITEM OR SERVICE: HCPCS | Performed by: ANESTHESIOLOGY

## 2024-02-05 PROCEDURE — 82570 ASSAY OF URINE CREATININE: CPT

## 2024-02-05 PROCEDURE — 86900 BLOOD TYPING SEROLOGIC ABO: CPT

## 2024-02-05 PROCEDURE — C1765 ADHESION BARRIER: HCPCS | Performed by: OBSTETRICS & GYNECOLOGY

## 2024-02-05 RX ORDER — HYDROMORPHONE HYDROCHLORIDE 1 MG/ML
0.4 INJECTION, SOLUTION INTRAMUSCULAR; INTRAVENOUS; SUBCUTANEOUS
Status: DISCONTINUED | OUTPATIENT
Start: 2024-02-05 | End: 2024-02-05

## 2024-02-05 RX ORDER — DIPHENHYDRAMINE HYDROCHLORIDE 50 MG/ML
25 INJECTION INTRAMUSCULAR; INTRAVENOUS EVERY 6 HOURS PRN
Status: ACTIVE | OUTPATIENT
Start: 2024-02-05 | End: 2024-02-06

## 2024-02-05 RX ORDER — TERBUTALINE SULFATE 1 MG/ML
0.25 INJECTION, SOLUTION SUBCUTANEOUS
Status: DISCONTINUED | OUTPATIENT
Start: 2024-02-05 | End: 2024-02-06 | Stop reason: HOSPADM

## 2024-02-05 RX ORDER — ONDANSETRON 2 MG/ML
4 INJECTION INTRAMUSCULAR; INTRAVENOUS EVERY 6 HOURS PRN
Status: DISCONTINUED | OUTPATIENT
Start: 2024-02-06 | End: 2024-02-09 | Stop reason: HOSPADM

## 2024-02-05 RX ORDER — ONDANSETRON 2 MG/ML
4 INJECTION INTRAMUSCULAR; INTRAVENOUS EVERY 6 HOURS PRN
Status: DISCONTINUED | OUTPATIENT
Start: 2024-02-05 | End: 2024-02-06 | Stop reason: HOSPADM

## 2024-02-05 RX ORDER — EPHEDRINE SULFATE 50 MG/ML
10 INJECTION, SOLUTION INTRAVENOUS
Status: ACTIVE | OUTPATIENT
Start: 2024-02-05 | End: 2024-02-06

## 2024-02-05 RX ORDER — ACETAMINOPHEN 500 MG
1000 TABLET ORAL EVERY 6 HOURS
Status: DISPENSED | OUTPATIENT
Start: 2024-02-05 | End: 2024-02-06

## 2024-02-05 RX ORDER — BUPIVACAINE HYDROCHLORIDE 7.5 MG/ML
INJECTION, SOLUTION INTRASPINAL PRN
Status: DISCONTINUED | OUTPATIENT
Start: 2024-02-05 | End: 2024-02-05 | Stop reason: SURG

## 2024-02-05 RX ORDER — OXYTOCIN 10 [USP'U]/ML
10 INJECTION, SOLUTION INTRAMUSCULAR; INTRAVENOUS
Status: DISCONTINUED | OUTPATIENT
Start: 2024-02-05 | End: 2024-02-06 | Stop reason: HOSPADM

## 2024-02-05 RX ORDER — DIPHENHYDRAMINE HYDROCHLORIDE 50 MG/ML
12.5 INJECTION INTRAMUSCULAR; INTRAVENOUS
Status: DISCONTINUED | OUTPATIENT
Start: 2024-02-05 | End: 2024-02-06 | Stop reason: HOSPADM

## 2024-02-05 RX ORDER — CALCIUM GLUCONATE 94 MG/ML
1 INJECTION, SOLUTION INTRAVENOUS
Status: DISCONTINUED | OUTPATIENT
Start: 2024-02-05 | End: 2024-02-09 | Stop reason: HOSPADM

## 2024-02-05 RX ORDER — SODIUM CHLORIDE, SODIUM LACTATE, POTASSIUM CHLORIDE, CALCIUM CHLORIDE 600; 310; 30; 20 MG/100ML; MG/100ML; MG/100ML; MG/100ML
INJECTION, SOLUTION INTRAVENOUS CONTINUOUS
Status: DISCONTINUED | OUTPATIENT
Start: 2024-02-05 | End: 2024-02-08

## 2024-02-05 RX ORDER — METOCLOPRAMIDE HYDROCHLORIDE 5 MG/ML
10 INJECTION INTRAMUSCULAR; INTRAVENOUS ONCE
Status: COMPLETED | OUTPATIENT
Start: 2024-02-05 | End: 2024-02-05

## 2024-02-05 RX ORDER — LABETALOL HYDROCHLORIDE 5 MG/ML
20 INJECTION, SOLUTION INTRAVENOUS ONCE
Status: COMPLETED | OUTPATIENT
Start: 2024-02-05 | End: 2024-02-05

## 2024-02-05 RX ORDER — ONDANSETRON 2 MG/ML
4 INJECTION INTRAMUSCULAR; INTRAVENOUS
Status: DISCONTINUED | OUTPATIENT
Start: 2024-02-05 | End: 2024-02-05

## 2024-02-05 RX ORDER — CEFAZOLIN SODIUM 1 G/3ML
2 INJECTION, POWDER, FOR SOLUTION INTRAMUSCULAR; INTRAVENOUS ONCE
Status: CANCELLED | OUTPATIENT
Start: 2024-02-05 | End: 2024-02-05

## 2024-02-05 RX ORDER — EPHEDRINE SULFATE 50 MG/ML
5 INJECTION, SOLUTION INTRAVENOUS
Status: DISCONTINUED | OUTPATIENT
Start: 2024-02-05 | End: 2024-02-05

## 2024-02-05 RX ORDER — CALCIUM CARBONATE 500 MG/1
1000 TABLET, CHEWABLE ORAL EVERY 6 HOURS PRN
Status: DISCONTINUED | OUTPATIENT
Start: 2024-02-05 | End: 2024-02-09 | Stop reason: HOSPADM

## 2024-02-05 RX ORDER — MAGNESIUM SULFATE HEPTAHYDRATE 40 MG/ML
1 INJECTION, SOLUTION INTRAVENOUS CONTINUOUS
Status: DISCONTINUED | OUTPATIENT
Start: 2024-02-05 | End: 2024-02-08

## 2024-02-05 RX ORDER — METOPROLOL TARTRATE 1 MG/ML
1 INJECTION, SOLUTION INTRAVENOUS
Status: DISCONTINUED | OUTPATIENT
Start: 2024-02-05 | End: 2024-02-06 | Stop reason: HOSPADM

## 2024-02-05 RX ORDER — OXYCODONE HCL 5 MG/5 ML
5 SOLUTION, ORAL ORAL
Status: DISCONTINUED | OUTPATIENT
Start: 2024-02-05 | End: 2024-02-05

## 2024-02-05 RX ORDER — NIFEDIPINE 30 MG/1
60 TABLET, EXTENDED RELEASE ORAL
Status: DISCONTINUED | OUTPATIENT
Start: 2024-02-05 | End: 2024-02-06

## 2024-02-05 RX ORDER — VITAMIN A ACETATE, BETA CAROTENE, ASCORBIC ACID, CHOLECALCIFEROL, .ALPHA.-TOCOPHEROL ACETATE, DL-, THIAMINE MONONITRATE, RIBOFLAVIN, NIACINAMIDE, PYRIDOXINE HYDROCHLORIDE, FOLIC ACID, CYANOCOBALAMIN, CALCIUM CARBONATE, FERROUS FUMARATE, ZINC OXIDE, CUPRIC OXIDE 3080; 12; 120; 400; 1; 1.84; 3; 20; 22; 920; 25; 200; 27; 10; 2 [IU]/1; UG/1; MG/1; [IU]/1; MG/1; MG/1; MG/1; MG/1; MG/1; [IU]/1; MG/1; MG/1; MG/1; MG/1; MG/1
1 TABLET, FILM COATED ORAL
Status: DISCONTINUED | OUTPATIENT
Start: 2024-02-05 | End: 2024-02-09 | Stop reason: HOSPADM

## 2024-02-05 RX ORDER — DIPHENHYDRAMINE HCL 25 MG
25 TABLET ORAL EVERY 6 HOURS PRN
Status: DISCONTINUED | OUTPATIENT
Start: 2024-02-06 | End: 2024-02-09 | Stop reason: HOSPADM

## 2024-02-05 RX ORDER — SODIUM CHLORIDE, SODIUM GLUCONATE, SODIUM ACETATE, POTASSIUM CHLORIDE AND MAGNESIUM CHLORIDE 526; 502; 368; 37; 30 MG/100ML; MG/100ML; MG/100ML; MG/100ML; MG/100ML
INJECTION, SOLUTION INTRAVENOUS
Status: DISCONTINUED | OUTPATIENT
Start: 2024-02-05 | End: 2024-02-05 | Stop reason: SURG

## 2024-02-05 RX ORDER — MAGNESIUM SULFATE HEPTAHYDRATE 40 MG/ML
4 INJECTION, SOLUTION INTRAVENOUS ONCE
Status: COMPLETED | OUTPATIENT
Start: 2024-02-05 | End: 2024-02-05

## 2024-02-05 RX ORDER — TRANEXAMIC ACID 100 MG/ML
INJECTION, SOLUTION INTRAVENOUS PRN
Status: DISCONTINUED | OUTPATIENT
Start: 2024-02-05 | End: 2024-02-05 | Stop reason: SURG

## 2024-02-05 RX ORDER — SODIUM CHLORIDE, SODIUM GLUCONATE, SODIUM ACETATE, POTASSIUM CHLORIDE AND MAGNESIUM CHLORIDE 526; 502; 368; 37; 30 MG/100ML; MG/100ML; MG/100ML; MG/100ML; MG/100ML
1000 INJECTION, SOLUTION INTRAVENOUS ONCE
Status: CANCELLED | OUTPATIENT
Start: 2024-02-05 | End: 2024-02-05

## 2024-02-05 RX ORDER — PHENYLEPHRINE HYDROCHLORIDE 10 MG/ML
INJECTION, SOLUTION INTRAMUSCULAR; INTRAVENOUS; SUBCUTANEOUS PRN
Status: DISCONTINUED | OUTPATIENT
Start: 2024-02-05 | End: 2024-02-05 | Stop reason: SURG

## 2024-02-05 RX ORDER — OXYCODONE HYDROCHLORIDE 10 MG/1
10 TABLET ORAL EVERY 4 HOURS PRN
Status: ACTIVE | OUTPATIENT
Start: 2024-02-05 | End: 2024-02-06

## 2024-02-05 RX ORDER — MORPHINE SULFATE 0.5 MG/ML
INJECTION, SOLUTION EPIDURAL; INTRATHECAL; INTRAVENOUS PRN
Status: DISCONTINUED | OUTPATIENT
Start: 2024-02-05 | End: 2024-02-05 | Stop reason: SURG

## 2024-02-05 RX ORDER — BISACODYL 10 MG
10 SUPPOSITORY, RECTAL RECTAL PRN
Status: DISCONTINUED | OUTPATIENT
Start: 2024-02-05 | End: 2024-02-09 | Stop reason: HOSPADM

## 2024-02-05 RX ORDER — KETOROLAC TROMETHAMINE 30 MG/ML
INJECTION, SOLUTION INTRAMUSCULAR; INTRAVENOUS PRN
Status: DISCONTINUED | OUTPATIENT
Start: 2024-02-05 | End: 2024-02-05 | Stop reason: SURG

## 2024-02-05 RX ORDER — ONDANSETRON 4 MG/1
4 TABLET, ORALLY DISINTEGRATING ORAL EVERY 6 HOURS PRN
Status: DISCONTINUED | OUTPATIENT
Start: 2024-02-05 | End: 2024-02-06 | Stop reason: HOSPADM

## 2024-02-05 RX ORDER — NIFEDIPINE 10 MG/1
20 CAPSULE ORAL ONCE
Status: COMPLETED | OUTPATIENT
Start: 2024-02-05 | End: 2024-02-05

## 2024-02-05 RX ORDER — SODIUM CHLORIDE, SODIUM LACTATE, POTASSIUM CHLORIDE, CALCIUM CHLORIDE 600; 310; 30; 20 MG/100ML; MG/100ML; MG/100ML; MG/100ML
INJECTION, SOLUTION INTRAVENOUS CONTINUOUS
Status: CANCELLED | OUTPATIENT
Start: 2024-02-05

## 2024-02-05 RX ORDER — HYDROMORPHONE HYDROCHLORIDE 1 MG/ML
0.1 INJECTION, SOLUTION INTRAMUSCULAR; INTRAVENOUS; SUBCUTANEOUS
Status: DISCONTINUED | OUTPATIENT
Start: 2024-02-05 | End: 2024-02-05

## 2024-02-05 RX ORDER — MISOPROSTOL 200 UG/1
800 TABLET ORAL
Status: DISCONTINUED | OUTPATIENT
Start: 2024-02-05 | End: 2024-02-06 | Stop reason: HOSPADM

## 2024-02-05 RX ORDER — HYDROMORPHONE HYDROCHLORIDE 1 MG/ML
0.4 INJECTION, SOLUTION INTRAMUSCULAR; INTRAVENOUS; SUBCUTANEOUS
Status: ACTIVE | OUTPATIENT
Start: 2024-02-05 | End: 2024-02-06

## 2024-02-05 RX ORDER — OXYCODONE HYDROCHLORIDE 5 MG/1
5 TABLET ORAL EVERY 4 HOURS PRN
Status: ACTIVE | OUTPATIENT
Start: 2024-02-05 | End: 2024-02-06

## 2024-02-05 RX ORDER — HYDROMORPHONE HYDROCHLORIDE 1 MG/ML
0.2 INJECTION, SOLUTION INTRAMUSCULAR; INTRAVENOUS; SUBCUTANEOUS
Status: DISCONTINUED | OUTPATIENT
Start: 2024-02-05 | End: 2024-02-05

## 2024-02-05 RX ORDER — CARBOPROST TROMETHAMINE 250 UG/ML
250 INJECTION, SOLUTION INTRAMUSCULAR
Status: CANCELLED | OUTPATIENT
Start: 2024-02-05

## 2024-02-05 RX ORDER — KETOROLAC TROMETHAMINE 15 MG/ML
15 INJECTION, SOLUTION INTRAMUSCULAR; INTRAVENOUS EVERY 6 HOURS
Status: DISPENSED | OUTPATIENT
Start: 2024-02-05 | End: 2024-02-06

## 2024-02-05 RX ORDER — DIPHENHYDRAMINE HYDROCHLORIDE 50 MG/ML
25 INJECTION INTRAMUSCULAR; INTRAVENOUS EVERY 6 HOURS PRN
Status: DISCONTINUED | OUTPATIENT
Start: 2024-02-06 | End: 2024-02-09 | Stop reason: HOSPADM

## 2024-02-05 RX ORDER — IBUPROFEN 800 MG/1
800 TABLET ORAL EVERY 8 HOURS PRN
Status: DISCONTINUED | OUTPATIENT
Start: 2024-02-09 | End: 2024-02-09 | Stop reason: HOSPADM

## 2024-02-05 RX ORDER — LIDOCAINE HYDROCHLORIDE 10 MG/ML
20 INJECTION, SOLUTION INFILTRATION; PERINEURAL
Status: DISCONTINUED | OUTPATIENT
Start: 2024-02-05 | End: 2024-02-06 | Stop reason: HOSPADM

## 2024-02-05 RX ORDER — CITRIC ACID/SODIUM CITRATE 334-500MG
30 SOLUTION, ORAL ORAL ONCE
Status: CANCELLED | OUTPATIENT
Start: 2024-02-05 | End: 2024-02-06

## 2024-02-05 RX ORDER — IBUPROFEN 800 MG/1
800 TABLET ORAL
Status: DISCONTINUED | OUTPATIENT
Start: 2024-02-05 | End: 2024-02-05

## 2024-02-05 RX ORDER — SODIUM CHLORIDE, SODIUM GLUCONATE, SODIUM ACETATE, POTASSIUM CHLORIDE AND MAGNESIUM CHLORIDE 526; 502; 368; 37; 30 MG/100ML; MG/100ML; MG/100ML; MG/100ML; MG/100ML
1000 INJECTION, SOLUTION INTRAVENOUS ONCE
Status: DISCONTINUED | OUTPATIENT
Start: 2024-02-05 | End: 2024-02-05 | Stop reason: HOSPADM

## 2024-02-05 RX ORDER — DIPHENHYDRAMINE HYDROCHLORIDE 50 MG/ML
12.5 INJECTION INTRAMUSCULAR; INTRAVENOUS EVERY 6 HOURS PRN
Status: ACTIVE | OUTPATIENT
Start: 2024-02-05 | End: 2024-02-06

## 2024-02-05 RX ORDER — NIFEDIPINE 10 MG/1
CAPSULE ORAL
Status: ACTIVE
Start: 2024-02-05 | End: 2024-02-06

## 2024-02-05 RX ORDER — LABETALOL HYDROCHLORIDE 5 MG/ML
10 INJECTION, SOLUTION INTRAVENOUS
Status: DISCONTINUED | OUTPATIENT
Start: 2024-02-05 | End: 2024-02-09 | Stop reason: HOSPADM

## 2024-02-05 RX ORDER — OXYCODONE HYDROCHLORIDE 5 MG/1
5 TABLET ORAL EVERY 4 HOURS PRN
Status: DISCONTINUED | OUTPATIENT
Start: 2024-02-06 | End: 2024-02-09 | Stop reason: HOSPADM

## 2024-02-05 RX ORDER — LEVOTHYROXINE SODIUM 0.05 MG/1
50 TABLET ORAL
Status: DISCONTINUED | OUTPATIENT
Start: 2024-02-06 | End: 2024-02-09 | Stop reason: HOSPADM

## 2024-02-05 RX ORDER — DOCUSATE SODIUM 100 MG/1
100 CAPSULE, LIQUID FILLED ORAL 2 TIMES DAILY PRN
Status: DISCONTINUED | OUTPATIENT
Start: 2024-02-05 | End: 2024-02-09 | Stop reason: HOSPADM

## 2024-02-05 RX ORDER — METOCLOPRAMIDE HYDROCHLORIDE 5 MG/ML
10 INJECTION INTRAMUSCULAR; INTRAVENOUS ONCE
Status: CANCELLED | OUTPATIENT
Start: 2024-02-05 | End: 2024-02-06

## 2024-02-05 RX ORDER — CEFAZOLIN SODIUM 1 G/3ML
INJECTION, POWDER, FOR SOLUTION INTRAMUSCULAR; INTRAVENOUS PRN
Status: DISCONTINUED | OUTPATIENT
Start: 2024-02-05 | End: 2024-02-05 | Stop reason: SURG

## 2024-02-05 RX ORDER — SODIUM CHLORIDE, SODIUM LACTATE, POTASSIUM CHLORIDE, CALCIUM CHLORIDE 600; 310; 30; 20 MG/100ML; MG/100ML; MG/100ML; MG/100ML
INJECTION, SOLUTION INTRAVENOUS PRN
Status: DISCONTINUED | OUTPATIENT
Start: 2024-02-05 | End: 2024-02-09 | Stop reason: HOSPADM

## 2024-02-05 RX ORDER — HALOPERIDOL 5 MG/ML
1 INJECTION INTRAMUSCULAR
Status: DISCONTINUED | OUTPATIENT
Start: 2024-02-05 | End: 2024-02-06 | Stop reason: HOSPADM

## 2024-02-05 RX ORDER — ACETAMINOPHEN 500 MG
1000 TABLET ORAL EVERY 6 HOURS PRN
Status: DISCONTINUED | OUTPATIENT
Start: 2024-02-09 | End: 2024-02-09 | Stop reason: HOSPADM

## 2024-02-05 RX ORDER — ONDANSETRON 4 MG/1
4 TABLET, ORALLY DISINTEGRATING ORAL EVERY 6 HOURS PRN
Status: DISCONTINUED | OUTPATIENT
Start: 2024-02-06 | End: 2024-02-09 | Stop reason: HOSPADM

## 2024-02-05 RX ORDER — SODIUM CHLORIDE, SODIUM LACTATE, POTASSIUM CHLORIDE, CALCIUM CHLORIDE 600; 310; 30; 20 MG/100ML; MG/100ML; MG/100ML; MG/100ML
INJECTION, SOLUTION INTRAVENOUS CONTINUOUS
Status: DISCONTINUED | OUTPATIENT
Start: 2024-02-05 | End: 2024-02-09

## 2024-02-05 RX ORDER — CARBOPROST TROMETHAMINE 250 UG/ML
INJECTION, SOLUTION INTRAMUSCULAR PRN
Status: DISCONTINUED | OUTPATIENT
Start: 2024-02-05 | End: 2024-02-05 | Stop reason: SURG

## 2024-02-05 RX ORDER — SIMETHICONE 125 MG
125 TABLET,CHEWABLE ORAL 4 TIMES DAILY PRN
Status: DISCONTINUED | OUTPATIENT
Start: 2024-02-05 | End: 2024-02-09 | Stop reason: HOSPADM

## 2024-02-05 RX ORDER — MEPERIDINE HYDROCHLORIDE 25 MG/ML
12.5 INJECTION INTRAMUSCULAR; INTRAVENOUS; SUBCUTANEOUS
Status: DISCONTINUED | OUTPATIENT
Start: 2024-02-05 | End: 2024-02-06 | Stop reason: HOSPADM

## 2024-02-05 RX ORDER — CITRIC ACID/SODIUM CITRATE 334-500MG
30 SOLUTION, ORAL ORAL ONCE
Status: COMPLETED | OUTPATIENT
Start: 2024-02-05 | End: 2024-02-05

## 2024-02-05 RX ORDER — HYDRALAZINE HYDROCHLORIDE 20 MG/ML
5 INJECTION INTRAMUSCULAR; INTRAVENOUS
Status: DISCONTINUED | OUTPATIENT
Start: 2024-02-05 | End: 2024-02-06 | Stop reason: HOSPADM

## 2024-02-05 RX ORDER — NIFEDIPINE 10 MG/1
10 CAPSULE ORAL ONCE
Status: COMPLETED | OUTPATIENT
Start: 2024-02-05 | End: 2024-02-05

## 2024-02-05 RX ORDER — OXYCODONE HYDROCHLORIDE 10 MG/1
10 TABLET ORAL EVERY 4 HOURS PRN
Status: DISCONTINUED | OUTPATIENT
Start: 2024-02-06 | End: 2024-02-09 | Stop reason: HOSPADM

## 2024-02-05 RX ORDER — CALCIUM GLUCONATE 94 MG/ML
1 INJECTION, SOLUTION INTRAVENOUS
Status: DISCONTINUED | OUTPATIENT
Start: 2024-02-05 | End: 2024-02-06 | Stop reason: HOSPADM

## 2024-02-05 RX ORDER — HYDRALAZINE HYDROCHLORIDE 20 MG/ML
10 INJECTION INTRAMUSCULAR; INTRAVENOUS EVERY 6 HOURS PRN
Status: DISCONTINUED | OUTPATIENT
Start: 2024-02-05 | End: 2024-02-08

## 2024-02-05 RX ORDER — IBUPROFEN 800 MG/1
800 TABLET ORAL EVERY 8 HOURS
Status: COMPLETED | OUTPATIENT
Start: 2024-02-06 | End: 2024-02-09

## 2024-02-05 RX ORDER — OXYCODONE HCL 5 MG/5 ML
10 SOLUTION, ORAL ORAL
Status: DISCONTINUED | OUTPATIENT
Start: 2024-02-05 | End: 2024-02-05

## 2024-02-05 RX ORDER — LABETALOL HYDROCHLORIDE 5 MG/ML
5 INJECTION, SOLUTION INTRAVENOUS
Status: DISCONTINUED | OUTPATIENT
Start: 2024-02-05 | End: 2024-02-06 | Stop reason: HOSPADM

## 2024-02-05 RX ORDER — MISOPROSTOL 200 UG/1
800 TABLET ORAL
Status: CANCELLED | OUTPATIENT
Start: 2024-02-05

## 2024-02-05 RX ORDER — ONDANSETRON 2 MG/ML
4 INJECTION INTRAMUSCULAR; INTRAVENOUS EVERY 6 HOURS PRN
Status: ACTIVE | OUTPATIENT
Start: 2024-02-05 | End: 2024-02-06

## 2024-02-05 RX ORDER — HYDROMORPHONE HYDROCHLORIDE 1 MG/ML
0.2 INJECTION, SOLUTION INTRAMUSCULAR; INTRAVENOUS; SUBCUTANEOUS
Status: ACTIVE | OUTPATIENT
Start: 2024-02-05 | End: 2024-02-06

## 2024-02-05 RX ORDER — ACETAMINOPHEN 500 MG
1000 TABLET ORAL EVERY 6 HOURS
Status: COMPLETED | OUTPATIENT
Start: 2024-02-06 | End: 2024-02-09

## 2024-02-05 RX ORDER — ACETAMINOPHEN 500 MG
1000 TABLET ORAL
Status: COMPLETED | OUTPATIENT
Start: 2024-02-05 | End: 2024-02-05

## 2024-02-05 RX ORDER — OXYTOCIN 10 [USP'U]/ML
10 INJECTION, SOLUTION INTRAMUSCULAR; INTRAVENOUS
Status: CANCELLED | OUTPATIENT
Start: 2024-02-05

## 2024-02-05 RX ORDER — MIDAZOLAM HYDROCHLORIDE 1 MG/ML
1 INJECTION INTRAMUSCULAR; INTRAVENOUS
Status: DISCONTINUED | OUTPATIENT
Start: 2024-02-05 | End: 2024-02-06 | Stop reason: HOSPADM

## 2024-02-05 RX ORDER — ONDANSETRON 2 MG/ML
INJECTION INTRAMUSCULAR; INTRAVENOUS PRN
Status: DISCONTINUED | OUTPATIENT
Start: 2024-02-05 | End: 2024-02-05 | Stop reason: SURG

## 2024-02-05 RX ORDER — SODIUM CHLORIDE, SODIUM LACTATE, POTASSIUM CHLORIDE, CALCIUM CHLORIDE 600; 310; 30; 20 MG/100ML; MG/100ML; MG/100ML; MG/100ML
INJECTION, SOLUTION INTRAVENOUS ONCE
Status: CANCELLED | OUTPATIENT
Start: 2024-02-05 | End: 2024-02-05

## 2024-02-05 RX ADMIN — NIFEDIPINE 60 MG: 30 TABLET, FILM COATED, EXTENDED RELEASE ORAL at 22:03

## 2024-02-05 RX ADMIN — SODIUM CHLORIDE 5 MILLION UNITS: 900 INJECTION INTRAVENOUS at 12:42

## 2024-02-05 RX ADMIN — MAGNESIUM SULFATE IN WATER 2 G/HR: 40 INJECTION, SOLUTION INTRAVENOUS at 12:11

## 2024-02-05 RX ADMIN — METOCLOPRAMIDE 10 MG: 5 INJECTION, SOLUTION INTRAMUSCULAR; INTRAVENOUS at 13:09

## 2024-02-05 RX ADMIN — OXYTOCIN 500 ML: 10 INJECTION, SOLUTION INTRAMUSCULAR; INTRAVENOUS at 13:46

## 2024-02-05 RX ADMIN — CEFAZOLIN 2 G: 1 INJECTION, POWDER, FOR SOLUTION INTRAMUSCULAR; INTRAVENOUS at 13:34

## 2024-02-05 RX ADMIN — NIFEDIPINE 20 MG: 10 CAPSULE ORAL at 18:22

## 2024-02-05 RX ADMIN — ACETAMINOPHEN 1000 MG: 500 TABLET ORAL at 22:02

## 2024-02-05 RX ADMIN — PHENYLEPHRINE HYDROCHLORIDE 100 MCG: 10 INJECTION INTRAVENOUS at 13:40

## 2024-02-05 RX ADMIN — SODIUM CHLORIDE, POTASSIUM CHLORIDE, SODIUM LACTATE AND CALCIUM CHLORIDE: 600; 310; 30; 20 INJECTION, SOLUTION INTRAVENOUS at 11:48

## 2024-02-05 RX ADMIN — PHENYLEPHRINE HYDROCHLORIDE 50 MCG/MIN: 10 INJECTION INTRAVENOUS at 13:32

## 2024-02-05 RX ADMIN — MAGNESIUM SULFATE HEPTAHYDRATE 4 G: 4 INJECTION, SOLUTION INTRAVENOUS at 11:50

## 2024-02-05 RX ADMIN — KETOROLAC TROMETHAMINE 30 MG: 30 INJECTION, SOLUTION INTRAMUSCULAR; INTRAVENOUS at 14:15

## 2024-02-05 RX ADMIN — CARBOPROST TROMETHAMINE 250 MCG: 250 INJECTION, SOLUTION INTRAMUSCULAR at 13:56

## 2024-02-05 RX ADMIN — HYDRALAZINE HYDROCHLORIDE 10 MG: 20 INJECTION, SOLUTION INTRAMUSCULAR; INTRAVENOUS at 12:37

## 2024-02-05 RX ADMIN — PHENYLEPHRINE HYDROCHLORIDE 100 MCG: 10 INJECTION INTRAVENOUS at 13:49

## 2024-02-05 RX ADMIN — SODIUM CHLORIDE, POTASSIUM CHLORIDE, SODIUM LACTATE AND CALCIUM CHLORIDE: 600; 310; 30; 20 INJECTION, SOLUTION INTRAVENOUS at 21:37

## 2024-02-05 RX ADMIN — NIFEDIPINE 10 MG: 10 CAPSULE ORAL at 12:09

## 2024-02-05 RX ADMIN — ONDANSETRON 4 MG: 2 INJECTION INTRAMUSCULAR; INTRAVENOUS at 13:34

## 2024-02-05 RX ADMIN — SODIUM CHLORIDE, POTASSIUM CHLORIDE, SODIUM LACTATE AND CALCIUM CHLORIDE: 600; 310; 30; 20 INJECTION, SOLUTION INTRAVENOUS at 12:40

## 2024-02-05 RX ADMIN — ACETAMINOPHEN 1000 MG: 500 TABLET ORAL at 15:48

## 2024-02-05 RX ADMIN — TRANEXAMIC ACID 1000 MG: 100 INJECTION, SOLUTION INTRAVENOUS at 13:56

## 2024-02-05 RX ADMIN — NIFEDIPINE 20 MG: 10 CAPSULE ORAL at 17:34

## 2024-02-05 RX ADMIN — MAGNESIUM SULFATE IN WATER 2 G/HR: 40 INJECTION, SOLUTION INTRAVENOUS at 14:53

## 2024-02-05 RX ADMIN — PHENYLEPHRINE HYDROCHLORIDE 100 MCG: 10 INJECTION INTRAVENOUS at 13:32

## 2024-02-05 RX ADMIN — MORPHINE SULFATE 150 MCG: 0.5 INJECTION, SOLUTION EPIDURAL; INTRATHECAL; INTRAVENOUS at 13:32

## 2024-02-05 RX ADMIN — SODIUM CITRATE AND CITRIC ACID MONOHYDRATE 30 ML: 334; 500 SOLUTION ORAL at 13:09

## 2024-02-05 RX ADMIN — LABETALOL HYDROCHLORIDE 20 MG: 5 INJECTION, SOLUTION INTRAVENOUS at 20:13

## 2024-02-05 RX ADMIN — SODIUM CHLORIDE, SODIUM GLUCONATE, SODIUM ACETATE, POTASSIUM CHLORIDE AND MAGNESIUM CHLORIDE: 526; 502; 368; 37; 30 INJECTION, SOLUTION INTRAVENOUS at 13:24

## 2024-02-05 RX ADMIN — NIFEDIPINE 10 MG: 10 CAPSULE ORAL at 11:26

## 2024-02-05 RX ADMIN — FAMOTIDINE 20 MG: 10 INJECTION, SOLUTION INTRAVENOUS at 13:09

## 2024-02-05 RX ADMIN — OXYTOCIN 75 ML/HR: 10 INJECTION, SOLUTION INTRAMUSCULAR; INTRAVENOUS at 14:51

## 2024-02-05 RX ADMIN — KETOROLAC TROMETHAMINE 15 MG: 15 INJECTION, SOLUTION INTRAMUSCULAR; INTRAVENOUS at 20:44

## 2024-02-05 RX ADMIN — FENTANYL CITRATE 15 MCG: 50 INJECTION, SOLUTION INTRAMUSCULAR; INTRAVENOUS at 13:32

## 2024-02-05 RX ADMIN — BUPIVACAINE HYDROCHLORIDE IN DEXTROSE 1.6 ML: 7.5 INJECTION, SOLUTION SUBARACHNOID at 13:32

## 2024-02-05 RX ADMIN — PHENYLEPHRINE HYDROCHLORIDE 100 MCG: 10 INJECTION INTRAVENOUS at 13:56

## 2024-02-05 ASSESSMENT — LIFESTYLE VARIABLES
TOTAL SCORE: 0
TOTAL SCORE: 0
CONSUMPTION TOTAL: NEGATIVE
AVERAGE NUMBER OF DAYS PER WEEK YOU HAVE A DRINK CONTAINING ALCOHOL: 0
TOTAL SCORE: 0
EVER HAD A DRINK FIRST THING IN THE MORNING TO STEADY YOUR NERVES TO GET RID OF A HANGOVER: NO
EVER FELT BAD OR GUILTY ABOUT YOUR DRINKING: NO
ON A TYPICAL DAY WHEN YOU DRINK ALCOHOL HOW MANY DRINKS DO YOU HAVE: 0
DOES PATIENT WANT TO STOP DRINKING: NO
HOW MANY TIMES IN THE PAST YEAR HAVE YOU HAD 5 OR MORE DRINKS IN A DAY: 0
HAVE YOU EVER FELT YOU SHOULD CUT DOWN ON YOUR DRINKING: NO
HAVE PEOPLE ANNOYED YOU BY CRITICIZING YOUR DRINKING: NO
ALCOHOL_USE: NO
EVER_SMOKED: NEVER

## 2024-02-05 ASSESSMENT — PAIN SCALES - GENERAL
PAIN_LEVEL: 0
PAINLEVEL: 0 - NO PAIN

## 2024-02-05 ASSESSMENT — PATIENT HEALTH QUESTIONNAIRE - PHQ9
1. LITTLE INTEREST OR PLEASURE IN DOING THINGS: NOT AT ALL
SUM OF ALL RESPONSES TO PHQ9 QUESTIONS 1 AND 2: 0
2. FEELING DOWN, DEPRESSED, IRRITABLE, OR HOPELESS: NOT AT ALL
SUM OF ALL RESPONSES TO PHQ9 QUESTIONS 1 AND 2: 0
2. FEELING DOWN, DEPRESSED, IRRITABLE, OR HOPELESS: NOT AT ALL
1. LITTLE INTEREST OR PLEASURE IN DOING THINGS: NOT AT ALL

## 2024-02-05 ASSESSMENT — FIBROSIS 4 INDEX: FIB4 SCORE: 1.78

## 2024-02-05 NOTE — ANESTHESIA POSTPROCEDURE EVALUATION
Patient: Dulce Lutz    Procedure Summary       Date: 02/05/24 Room / Location:     Anesthesia Start: 1324 Anesthesia Stop: 1425    Procedure: Procedure Not Yet Scheduled Diagnosis:     Scheduled Providers:  Responsible Provider: Kamron Pierre D.O.    Anesthesia Type: spinal ASA Status: 2 - Emergent            Final Anesthesia Type: spinal  Last vitals  BP   Blood Pressure: (!) 207/114    Temp   36.3 °C (97.3 °F)    Pulse   (!) 118   Resp   18    SpO2   95 %      Anesthesia Post Evaluation    Patient location during evaluation: PACU  Patient participation: complete - patient participated  Level of consciousness: awake and alert  Pain score: 0    Airway patency: patent  Anesthetic complications: no  Cardiovascular status: hemodynamically stable  Respiratory status: acceptable  Hydration status: euvolemic    PONV: none          No notable events documented.     Nurse Pain Score: 0 (NPRS)

## 2024-02-05 NOTE — PROGRESS NOTES
EDC    3/17/2024  EGA    34w1d    1100: TOCO/US applied, pt educated. Pt was sent over from the office for elevated BP's. Pt crying at this time, states she is really stressed because she just found out her grandmother passed away. Pt declines headaches, vision changes, any new generalized swelling, and declines epigastric pain. Pt declines LOF, VB, UC's, and states +FM. Pt states she has had high blood pressure prior to pregnancy. Pt educated, and questions and concerns addressed. [facility] orders received.     1105: Dr. Fernando updated with BP, states if next BP is in severe range, give 10 mg of nifedipine and start an IV.     1126: Orders received from Dr. Giron to admit patient to labor and delivery and start magnesium sulfate stat.     1137: Pt moved from LDA3 to S212, report given to ADELAIDA Nuno.

## 2024-02-05 NOTE — OR SURGEON
Immediate Post OP Note    PreOp Diagnosis: Intrauterine pregnancy at 34 weeks 1 day gestational age, AMA, preeclampsia with severe features, inability to control blood pressures, remote from delivery      PostOp Diagnosis: Same      Procedure(s):   SECTION, PRIMARY    Surgeon(s):  Heriberto Giron M.D.    Assistant: Radha Fernando MD      Anesthesiologist/Type of Anesthesia:  Anesthesiologist: (Unknown)/Spinal    Surgical Staff:  Circulator: (Unknown)  Scrub Person: (Unknown)  Respiratory Technician: (Unknown)  L&D Baby  Nurse: (Unknown)    Specimens removed if any:  * No specimens in log *    Estimated Blood Loss: 600 mL    Findings: Male infant, Apgars of 6 and 9, weight 2322 g.  Normal left tube and ovary.  Significantly adherent right fallopian tube to the right ovary.  Normal placenta    Complications: None        2024 2:35 PM Heriberto Giron M.D.

## 2024-02-05 NOTE — ANESTHESIA PROCEDURE NOTES
Spinal Block    Date/Time: 2/5/2024 1:28 PM    Performed by: Kamron Pierre D.O.  Authorized by: Kamron Pierre D.O.    Start Time:  2/5/2024 1:28 PM  End Time:  2/5/2024 1:32 PM  Reason for Block: primary anesthetic    patient identified, IV checked, site marked, risks and benefits discussed, surgical consent, monitors and equipment checked, pre-op evaluation and timeout performed    Patient Position:  Sitting  Prep: ChloraPrep, patient draped and sterile technique    Monitoring:  Blood pressure, continuous pulse oximetry and heart rate  Approach:  Midline  Location:  L3-4  Injection Technique:  Single-shot  Skin infiltration:  Lidocaine  Strength:  1%  Dose:  3ml  Needle Type:  Pencan  Needle Gauge:  25 G  CSF flowing pre/post injection:  Yes  Sensory Level:  T4

## 2024-02-05 NOTE — PROGRESS NOTES
1137 Report received from ADELAIDA Wharton. POC discussed for stat magnesium bolus and transfer to labor room.     1245 Decision for  section made with patient and Dr. Giron. Prepped for surgery.    1324 Pt transferred in wheelchair to OR 1 in stable condition at this time    1425 Pt transferred to PACU 3 in stable condition via gurney    1728 Dr. Giron given report on patient. Orders received for hypertensive medication, see MAR, and to stay on L&D overnight.     190 Dr. Giron updated on BP. Transfer to antepartum room, and hourly VS.    191 Report given to ADELAIDA Rodrigues. POC discussed for BP management and magnesium management. Transferred from pacu3 to s232 on rney with side rails up, in stable condition. FOB at side with belongings.

## 2024-02-05 NOTE — PROGRESS NOTES
Dulce Lutz   Gestational age: 34w1d     Indication: preeclampsia    NST Interpretation:  Baseline 140s, Reactive    Bps noted - 178/108, 160/103, 155/97, 171/98, 161/101.     To L & D stat for preeclamptic workup.       Amy Winn M.D.

## 2024-02-05 NOTE — H&P
History and Physical    Dulce Lutz is a 38 y.o. female  at 34w1d who presents for evaluation of high blood pressure    Subjective: 38-year-old  2 para 0-1-0-1 at 34 weeks 1 day gestational age.  Patient sent from clinic secondary to elevated blood pressures noted during NST.  Patient noted to have significantly elevated blood pressure at the time of presentation, 200/112, 215/113.  Patient received 2 dose nifedipine as well as 1 dose of hydralazine.  Latest blood pressure despite administration of antihypertensives is 207/114.  Pregnancy complicated by AMA, long interpregnancy interval and inconsistent prenatal visits.    CTXs: negative   Pain: negative  LOF: negative  Vaginal bleeding: negative   Fetal movement: positive    ROS: Pertinent positives documented in HPI and all other systems reviewed & are negative    OB History    Para Term  AB Living   2 1   1   1   SAB IAB Ectopic Molar Multiple Live Births             1      # Outcome Date GA Lbr Jesus Manuel/2nd Weight Sex Delivery Anes PTL Lv   2 Current            1  11 36w0d  2.722 kg (6 lb) F Vag-Spont  Y [place]      Birth Comments: PPROM then IOL/augmentation      Complications: PROM (premature rupture of membranes)       PGYNHx: None  History reviewed. No pertinent past medical history.    Past Surgical History:   Procedure Laterality Date    CYST EXCISION      R.breast ( benign)         Current Facility-Administered Medications:     LR infusion, , Intravenous, Continuous, Heriberto Giron M.D., Last Rate: 125 mL/hr at 24 1148, New Bag at 24 1148    lidocaine (XYLOCAINE) 1%  injection, 20 mL, Subcutaneous, Once PRN, Heriberto Giron M.D.    terbutaline (Brethine) injection 0.25 mg, 0.25 mg, Subcutaneous, Once PRN, Heriberto Giron M.D.    oxytocin (Pitocin) infusion bolus (for post delivery), 20 Units, Intravenous, Once **FOLLOWED BY** oxytocin (Pitocin) infusion (for post delivery), 125 mL/hr,  Intravenous, Continuous, Heriberto Giron M.D.    oxytocin (Pitocin) injection 10 Units, 10 Units, Intramuscular, Once PRN, Heriberto Giron M.D.    ibuprofen (Motrin) tablet 800 mg, 800 mg, Oral, Once PRN, Heriberto Giron M.D.    acetaminophen (Tylenol) tablet 1,000 mg, 1,000 mg, Oral, Once PRN, Heriberto Giron M.D.    lactated ringers infusion, , Intravenous, Continuous, Heriberto Giron M.D., Last Rate: 75 mL/hr at 02/05/24 1240, New Bag at 02/05/24 1240    calcium GLUConate injection 1 g, 1 g, Intravenous, Once PRN, Heriberto Giron M.D.    penicillin G potassium 5 Million Units in  mL IVPB, 5 Million Units, Intravenous, Once, Last Rate: 200 mL/hr at 02/05/24 1242, 5 Million Units at 02/05/24 1242 **FOLLOWED BY** penicillin G potassium 2.5 million units in  mL IVPB, 2.5 Million Units, Intravenous, Q4HRS, Heriberto Giron M.D.    ondansetron (Zofran ODT) dispertab 4 mg, 4 mg, Oral, Q6HRS PRN **OR** ondansetron (Zofran) syringe/vial injection 4 mg, 4 mg, Intravenous, Q6HRS PRN, Heriberto Giron M.D.    miSOPROStol (Cytotec) tablet 800 mcg, 800 mcg, Rectal, Once PRN, Heriberto Giron M.D.    [COMPLETED] magnesium sulfate IVPB premix 4 g, 4 g, Intravenous, Once, Stopped at 02/05/24 1210 **FOLLOWED BY** magnesium sulfate 40 g/1000mL infusion, 2 g/hr, Intravenous, Continuous, Heriberto Giron M.D., Last Rate: 50 mL/hr at 02/05/24 1236, 2 g/hr at 02/05/24 1236    oxytocin (Pitocin) infusion (for induction), 0.5-20 chaya-units/min, Intravenous, Continuous, Heriberto Giron M.D.    hydrALAZINE (Apresoline) injection 10 mg, 10 mg, Intravenous, Q6HRS PRN, Heriberto Giron M.D., 10 mg at 02/05/24 1237    electrolyte-A (Plasmalyte-A) (BOLUS) infusion 1,000 mL, 1,000 mL, Intravenous, Once, Heriberto Giron M.D.    PHENYLEPHRINE 40 MG/250 ML NS PREMIX, , , ,     Allergies: Patient has no known allergies.    Social History     Socioeconomic History    Marital status:   "    Spouse name: Not on file    Number of children: Not on file    Years of education: Not on file    Highest education level: Not on file   Occupational History    Not on file   Tobacco Use    Smoking status: Never    Smokeless tobacco: Never   Vaping Use    Vaping Use: Never used   Substance and Sexual Activity    Alcohol use: Never    Drug use: Never    Sexual activity: Not Currently     Partners: Male   Other Topics Concern    Not on file   Social History Narrative    Not on file     Social Determinants of Health     Financial Resource Strain: Not on file   Food Insecurity: Not on file   Transportation Needs: Not on file   Physical Activity: Not on file   Stress: Not on file   Social Connections: Not on file   Intimate Partner Violence: Not on file   Housing Stability: Not on file         FamHx: Noncontributory    Prenatal care with [facility] with following problems:  Patient Active Problem List    Diagnosis Date Noted    Pre-eclampsia affecting childbirth 02/05/2024    Pre-eclampsia in third trimester 02/04/2024    Elevated blood pressure affecting pregnancy in third trimester - PIH, P:C labs ordered 2/2 [  ] 02/02/2024    Encounter for supervision of high risk pregnancy in third trimester, antepartum 01/19/2024    Abnormal pregnancy US - R ventricle subjectively smaller than normal - MFM shows normal US, no f/u 11/03/2023    History of PTD at 36wk with PROM - 2011 08/25/2023    Hypothyroidism affecting pregnancy in first trimester 08/25/2023    Advanced maternal age in multigravida 08/02/2023         Objective:      BP (!) 207/114   Pulse (!) 118   Temp 36.3 °C (97.3 °F) (Temporal)   Resp 18   Ht 1.575 m (5' 2\")   Wt 84.4 kg (186 lb)   SpO2 95%     General:   no acute distress, alert, cooperative.  Complains of headache   Skin:   normal   HEENT:  PERRLA and EOMI   Lungs:   CTA bilateral   Heart:   chest is clear without rales or wheezing, no pedal edema, S1, S2 normal, no murmur, regular rate " and rhythm   Abdomen:   soft, gravid, NT   EFW: 2100 g   Pelvis:  adequate with gynecoid pelvis   FHT:  135 BPM  Accels present  Decels absent  Variability moderate  Category 1   Uterine Size: S=D   Presentations: Cephalic   Cervix:     Dilation: Closed    Effacement: Long    Station:  Floating    Consistency: Firm    Position: Posterior     Lab Review  Lab:   Blood type: A     Recent Results (from the past 5880 hour(s))   CBC WITH DIFFERENTIAL    Collection Time: 08/19/23  2:21 PM   Result Value Ref Range    WBC 8.4 4.8 - 10.8 K/uL    RBC 5.17 4.20 - 5.40 M/uL    Hemoglobin 14.4 12.0 - 16.0 g/dL    Hematocrit 43.4 37.0 - 47.0 %    MCV 83.9 81.4 - 97.8 fL    MCH 27.9 27.0 - 33.0 pg    MCHC 33.2 32.2 - 35.5 g/dL    RDW 42.7 35.9 - 50.0 fL    Platelet Count 262 164 - 446 K/uL    MPV 9.2 9.0 - 12.9 fL    Neutrophils-Polys 55.70 44.00 - 72.00 %    Lymphocytes 34.80 22.00 - 41.00 %    Monocytes 6.50 0.00 - 13.40 %    Eosinophils 1.90 0.00 - 6.90 %    Basophils 0.70 0.00 - 1.80 %    Immature Granulocytes 0.40 0.00 - 0.90 %    Nucleated RBC 0.00 0.00 - 0.20 /100 WBC    Neutrophils (Absolute) 4.70 1.82 - 7.42 K/uL    Lymphs (Absolute) 2.93 1.00 - 4.80 K/uL    Monos (Absolute) 0.55 0.00 - 0.85 K/uL    Eos (Absolute) 0.16 0.00 - 0.51 K/uL    Baso (Absolute) 0.06 0.00 - 0.12 K/uL    Immature Granulocytes (abs) 0.03 0.00 - 0.11 K/uL    NRBC (Absolute) 0.00 K/uL   COMP METABOLIC PANEL    Collection Time: 08/19/23  2:21 PM   Result Value Ref Range    Sodium 138 135 - 145 mmol/L    Potassium 4.1 3.6 - 5.5 mmol/L    Chloride 105 96 - 112 mmol/L    Co2 20 20 - 33 mmol/L    Anion Gap 13.0 7.0 - 16.0    Glucose 114 (H) 65 - 99 mg/dL    Bun 7 (L) 8 - 22 mg/dL    Creatinine 0.52 0.50 - 1.40 mg/dL    Calcium 9.4 8.5 - 10.5 mg/dL    Correct Calcium 9.4 8.5 - 10.5 mg/dL    AST(SGOT) 19 12 - 45 U/L    ALT(SGPT) 22 2 - 50 U/L    Alkaline Phosphatase 89 30 - 99 U/L    Total Bilirubin 0.3 0.1 - 1.5 mg/dL    Albumin 4.0 3.2 - 4.9 g/dL    Total  Protein 7.3 6.0 - 8.2 g/dL    Globulin 3.3 1.9 - 3.5 g/dL    A-G Ratio 1.2 g/dL   LIPASE    Collection Time: 08/19/23  2:21 PM   Result Value Ref Range    Lipase 38 11 - 82 U/L   HCG QUANTITATIVE    Collection Time: 08/19/23  2:21 PM   Result Value Ref Range    Bhcg 623205.0 (H) 0.0 - 5.0 mIU/mL   ESTIMATED GFR    Collection Time: 08/19/23  2:21 PM   Result Value Ref Range    GFR (CKD-EPI) 121 >60 mL/min/1.73 m 2   RH TYPE FOR RHOGAM FROM E.D.    Collection Time: 08/19/23  2:21 PM   Result Value Ref Range    Emergency Department Rh Typing POS     Number Of Rh Doses Indicated ZERO    URINALYSIS,CULTURE IF INDICATED    Collection Time: 08/19/23  2:53 PM    Specimen: Urine   Result Value Ref Range    Color Yellow     Character Clear     Specific Gravity 1.004 <1.035    Ph 6.5 5.0 - 8.0    Glucose Negative Negative mg/dL    Ketones Negative Negative mg/dL    Protein Negative Negative mg/dL    Bilirubin Negative Negative    Urobilinogen, Urine 0.2 Negative    Nitrite Negative Negative    Leukocyte Esterase Negative Negative    Occult Blood Small (A) Negative    Micro Urine Req Microscopic    URINE MICROSCOPIC (W/UA)    Collection Time: 08/19/23  2:53 PM   Result Value Ref Range    WBC 0-2 /hpf    RBC 0-2 /hpf    Bacteria Negative None /hpf    Epithelial Cells Negative /hpf    Hyaline Cast 0-2 /lpf   THINPREP PAP W/HPV AND CTNG    Collection Time: 08/25/23  1:48 PM   Result Value Ref Range    DIAGNOSIS: SEE BELOW     Specimen adequacy: SEE BELOW     Performed by: SEE BELOW     Comment Comment     Note: SEE BELOW     Test Methodology: SEE BELOW     HPV Aptima Negative Negative    HPV Reflex SEE BELOW     Chlamydia, Nuc. Acid Amp Negative Negative    Gonococcus, Nuc. Acid Amp Negative Negative   PREG CNTR PRENATAL PN    Collection Time: 08/25/23  2:10 PM   Result Value Ref Range    WBC 10.5 4.8 - 10.8 K/uL    RBC 4.94 4.20 - 5.40 M/uL    Hemoglobin 13.7 12.0 - 16.0 g/dL    Hematocrit 42.4 37.0 - 47.0 %    MCV 85.8 81.4 -  97.8 fL    MCH 27.7 27.0 - 33.0 pg    MCHC 32.3 32.2 - 35.5 g/dL    RDW 43.8 35.9 - 50.0 fL    Platelet Count 256 164 - 446 K/uL    MPV 10.1 9.0 - 12.9 fL    Hepatitis C Antibody Non-Reactive Non-Reactive    Hepatitis B Surface Antigen Non-Reactive Non-Reactive    Rubella IgG Antibody 286.00 IU/mL    Syphilis, Treponemal Qual Non-Reactive Non-Reactive   URINE DRUG SCREEN W/CONF (AR)    Collection Time: 08/25/23  2:10 PM   Result Value Ref Range    Urine Amphetamine-Methamphetam Negative Cutoff 300 ng/mL    Barbiturates Negative Cutoff 200 ng/mL    Benzodiazepines Negative Cutoff 200 ng/mL    Propoxyphene Negative Cutoff 300 ng/mL    Cocaine Metabolite Negative Cutoff 150 ng/mL    Methadone Negative Cutoff 150 ng/mL    Codeine-Morphine Negative Cutoff 300 ng/mL    Phencyclidine -Pcp Negative Cutoff 25 ng/mL    Cannabinoid Metab Negative Cutoff 50 ng/mL    Drug Comment Urine Drugs See Note    URINE CULTURE(NEW)    Collection Time: 08/25/23  2:10 PM    Specimen: Urine   Result Value Ref Range    Significant Indicator NEG     Source UR     Site Clean Catch     Culture Result Usual urogenital colten ,000 cfu/mL    HIV AG/AB COMBO ASSAY SCREENING    Collection Time: 08/25/23  2:10 PM   Result Value Ref Range    HIV Ag/Ab Combo Assay Non-Reactive Non Reactive   OP Prenatal Panel-Blood Bank    Collection Time: 08/25/23  2:10 PM   Result Value Ref Range    ABO Grouping Only A     Rh Grouping Only POS     Antibody Screen Scrn NEG    TSH    Collection Time: 08/25/23  2:10 PM   Result Value Ref Range    TSH 2.040 0.380 - 5.330 uIU/mL   FREE THYROXINE    Collection Time: 08/25/23  2:10 PM   Result Value Ref Range    Free T-4 1.08 0.93 - 1.70 ng/dL   URINE CULTURE(NEW)    Collection Time: 08/31/23  9:16 AM    Specimen: Urine   Result Value Ref Range    Significant Indicator NEG     Source UR     Site Clean Catch     Culture Result No growth at 48 hours.    T.PALLIDUM AB CARY (SCREENING)    Collection Time: 12/29/23  7:56 AM    Result Value Ref Range    Syphilis, Treponemal Qual Non-Reactive Non-Reactive   CBC WITHOUT DIFFERENTIAL    Collection Time: 12/29/23  7:56 AM   Result Value Ref Range    WBC 8.6 4.8 - 10.8 K/uL    RBC 4.60 4.20 - 5.40 M/uL    Hemoglobin 13.3 12.0 - 16.0 g/dL    Hematocrit 40.4 37.0 - 47.0 %    MCV 87.8 81.4 - 97.8 fL    MCH 28.9 27.0 - 33.0 pg    MCHC 32.9 32.2 - 35.5 g/dL    RDW 43.8 35.9 - 50.0 fL    Platelet Count 206 164 - 446 K/uL    MPV 10.2 9.0 - 12.9 fL   GLUCOSE 1HR GESTATIONAL    Collection Time: 12/29/23  7:56 AM   Result Value Ref Range    Glucose, Post Dose 144 (H) 70 - 139 mg/dL   TSH    Collection Time: 12/29/23  7:56 AM   Result Value Ref Range    TSH 1.350 0.380 - 5.330 uIU/mL   FREE THYROXINE    Collection Time: 12/29/23  7:56 AM   Result Value Ref Range    Free T-4 0.74 (L) 0.93 - 1.70 ng/dL   GLUCOSE TOLERANCE 3 HOUR    Collection Time: 01/05/24  6:53 AM   Result Value Ref Range    Glucose, Fasting See Comment 65 - 99 mg/dL    Glucose 1 Hour RR 65 - 199 mg/dL    Glucose 2 Hour RR 65 - 139 mg/dL    Glucose 3 Hour RR 65 - 109 mg/dL   GLUCOSE 3 HR GESTATIONAL    Collection Time: 01/05/24  6:53 AM   Result Value Ref Range    Baseline Glucose 80 65 - 95 mg/dL    Glucose 1 Hour 161 65 - 180 mg/dL    Glucose 2 Hour 151 65 - 155 mg/dL    Glucose 3 Hour 151 (H) 65 - 140 mg/dL   POCT Urinalysis    Collection Time: 02/02/24  8:58 AM   Result Value Ref Range    POC Color dede Negative    POC Appearance cloudy Negative    POC Glucose negative Negative mg/dL    POC Bilirubin small Negative mg/dL    POC Ketones trace Negative mg/dL    POC Specific Gravity 1.025 <1.005 - >1.030    POC Blood trace Negative    POC Urine PH 5.5 5.0 - 8.0    POC Protein 100 mg Negative mg/dL    POC Urobiligen 1.0 Negative (0.2) mg/dL    POC Nitrites negative Negative    POC Leukocyte Esterase negative Negative   URIC ACID    Collection Time: 02/02/24 10:01 AM   Result Value Ref Range    Uric Acid 8.1 1.9 - 8.2 mg/dL   CBC  WITHOUT DIFFERENTIAL    Collection Time: 02/02/24 10:01 AM   Result Value Ref Range    WBC 7.0 4.8 - 10.8 K/uL    RBC 4.76 4.20 - 5.40 M/uL    Hemoglobin 14.4 12.0 - 16.0 g/dL    Hematocrit 41.2 37.0 - 47.0 %    MCV 86.6 81.4 - 97.8 fL    MCH 30.3 27.0 - 33.0 pg    MCHC 35.0 32.2 - 35.5 g/dL    RDW 43.1 35.9 - 50.0 fL    Platelet Count 132 (L) 164 - 446 K/uL    MPV 11.7 9.0 - 12.9 fL   Comp Metabolic Panel    Collection Time: 02/02/24 10:01 AM   Result Value Ref Range    Sodium 136 135 - 145 mmol/L    Potassium 4.0 3.6 - 5.5 mmol/L    Chloride 105 96 - 112 mmol/L    Co2 17 (L) 20 - 33 mmol/L    Anion Gap 14.0 7.0 - 16.0    Glucose 113 (H) 65 - 99 mg/dL    Bun 11 8 - 22 mg/dL    Creatinine 0.65 0.50 - 1.40 mg/dL    Calcium 8.8 8.5 - 10.5 mg/dL    Correct Calcium 9.7 8.5 - 10.5 mg/dL    AST(SGOT) 20 12 - 45 U/L    ALT(SGPT) 11 2 - 50 U/L    Alkaline Phosphatase 211 (H) 30 - 99 U/L    Total Bilirubin 0.3 0.1 - 1.5 mg/dL    Albumin 2.9 (L) 3.2 - 4.9 g/dL    Total Protein 6.2 6.0 - 8.2 g/dL    Globulin 3.3 1.9 - 3.5 g/dL    A-G Ratio 0.9 g/dL   FASTING STATUS    Collection Time: 02/02/24 10:01 AM   Result Value Ref Range    Fasting Status Non-Fasting    PROTEIN/CREAT RATIO URINE    Collection Time: 02/02/24 10:01 AM   Result Value Ref Range    Total Protein, Urine 15.0 0.0 - 15.0 mg/dL    Creatinine, Random Urine 30.51 mg/dL    Protein Creatinine Ratio 492 (H) 10 - 107 mg/g   ESTIMATED GFR    Collection Time: 02/02/24 10:01 AM   Result Value Ref Range    GFR (CKD-EPI) 114 >60 mL/min/1.73 m 2   PROTEIN/CREAT RATIO URINE    Collection Time: 02/05/24 10:55 AM   Result Value Ref Range    Total Protein, Urine 144.0 (H) 0.0 - 15.0 mg/dL    Creatinine, Random Urine 16.79 mg/dL    Protein Creatinine Ratio 8577 (H) 10 - 107 mg/g   CBC WITH DIFFERENTIAL    Collection Time: 02/05/24 11:14 AM   Result Value Ref Range    WBC 9.2 4.8 - 10.8 K/uL    RBC 4.85 4.20 - 5.40 M/uL    Hemoglobin 14.5 12.0 - 16.0 g/dL    Hematocrit 42.4 37.0 -  47.0 %    MCV 87.4 81.4 - 97.8 fL    MCH 29.9 27.0 - 33.0 pg    MCHC 34.2 32.2 - 35.5 g/dL    RDW 43.1 35.9 - 50.0 fL    Platelet Count 132 (L) 164 - 446 K/uL    MPV 11.3 9.0 - 12.9 fL    Neutrophils-Polys 62.10 44.00 - 72.00 %    Lymphocytes 28.20 22.00 - 41.00 %    Monocytes 8.40 0.00 - 13.40 %    Eosinophils 0.50 0.00 - 6.90 %    Basophils 0.30 0.00 - 1.80 %    Immature Granulocytes 0.50 0.00 - 0.90 %    Nucleated RBC 0.20 0.00 - 0.20 /100 WBC    Neutrophils (Absolute) 5.71 1.82 - 7.42 K/uL    Lymphs (Absolute) 2.59 1.00 - 4.80 K/uL    Monos (Absolute) 0.77 0.00 - 0.85 K/uL    Eos (Absolute) 0.05 0.00 - 0.51 K/uL    Baso (Absolute) 0.03 0.00 - 0.12 K/uL    Immature Granulocytes (abs) 0.05 0.00 - 0.11 K/uL    NRBC (Absolute) 0.02 K/uL   Comp Metabolic Panel    Collection Time: 02/05/24 11:14 AM   Result Value Ref Range    Sodium 138 135 - 145 mmol/L    Potassium 4.1 3.6 - 5.5 mmol/L    Chloride 108 96 - 112 mmol/L    Co2 19 (L) 20 - 33 mmol/L    Anion Gap 11.0 7.0 - 16.0    Glucose 92 65 - 99 mg/dL    Bun 15 8 - 22 mg/dL    Creatinine 0.69 0.50 - 1.40 mg/dL    Calcium 8.9 8.5 - 10.5 mg/dL    Correct Calcium 9.9 8.5 - 10.5 mg/dL    AST(SGOT) 15 12 - 45 U/L    ALT(SGPT) 12 2 - 50 U/L    Alkaline Phosphatase 203 (H) 30 - 99 U/L    Total Bilirubin 0.3 0.1 - 1.5 mg/dL    Albumin 2.8 (L) 3.2 - 4.9 g/dL    Total Protein 6.1 6.0 - 8.2 g/dL    Globulin 3.3 1.9 - 3.5 g/dL    A-G Ratio 0.8 g/dL   MAGNESIUM    Collection Time: 02/05/24 11:14 AM   Result Value Ref Range    Magnesium 2.0 1.5 - 2.5 mg/dL   ESTIMATED GFR    Collection Time: 02/05/24 11:14 AM   Result Value Ref Range    GFR (CKD-EPI) 113 >60 mL/min/1.73 m 2   T.PALLIDUM AB CARY (Syphilis)    Collection Time: 02/05/24 11:35 AM   Result Value Ref Range    Syphilis, Treponemal Qual Non-Reactive Non-Reactive        Assessment:   Dulce Lutz at 34w1d  Labor status: Not in labor.  Preeclampsia with severe features  AMA  Remote from delivery  Headache  Inability  to control blood pressure    Obstetrical history significant for   Patient Active Problem List    Diagnosis Date Noted    Pre-eclampsia affecting childbirth 2024    Pre-eclampsia in third trimester 2024    Elevated blood pressure affecting pregnancy in third trimester - PIH, P:C labs ordered  [  ] 2024    Encounter for supervision of high risk pregnancy in third trimester, antepartum 2024    Abnormal pregnancy US - R ventricle subjectively smaller than normal - MFM shows normal US, no f/u 2023    History of PTD at 36wk with PROM - 2023    Hypothyroidism affecting pregnancy in first trimester 2023    Advanced maternal age in multigravida 2023   .   EFW:   Plan:     Admit to L&D  GBS unknown  Fetal monitoring/toco     Discussed with patient findings on laboratory evaluation along with clinical findings.  Patient has significantly elevated blood pressure despite 3 doses of antihypertensives.  She continues to be well over 200s/100s.  Patient now has a headache when compared to before.  Discussed with the patient this may be signs of worsening symptoms but may also be a side effect of either the magnesium sulfate or the nifedipine.  Regardless, given inability to control blood pressure, remoteness from delivery, significantly elevated blood pressures in the stroke range, recommendations for a primary  were made.    Patient was also interested in sterilization but has not signed sterilization consent form, therefore we will be unable to perform it at this time    Discussed with the patient the risks of  delivery. The risks include DVT/pulmonary embolism, pelvic scarring, pelvic pain, infection, bleeding, scarring, damage to other organs in the area of operation, anesthesia complications, and death. Specifically organs that can be damaged are bowel, bladder, ureters. I also discussed with the patient the risk of wound infection and wound breakdown.  We discussed that these risks are greater in people that have diabetes or obesity. I also discussed the risk of emergency blood transfusion during procedure as well as emergency hysterectomy during procedure. Patient had the opportunity to ask questions regarding procedures. All questions answered to the patient's satisfaction. Pt agrees to proceed with surgery.     Will proceed to operating room as soon as possible

## 2024-02-05 NOTE — ANESTHESIA PREPROCEDURE EVALUATION
Date: 24  Procedure: Procedure Not Yet Scheduled        @ 34w1d, [facility], [place]  Severe BP with systolic > 200 despite Mg gtt  Ate eggs @ 0800  Relevant Problems   CARDIAC   (positive) Pre-eclampsia affecting childbirth   (positive) Pre-eclampsia in third trimester      ENDO   (positive) Hypothyroidism affecting pregnancy in first trimester      OB   (positive) Pre-eclampsia affecting childbirth   (positive) Pre-eclampsia in third trimester       Physical Exam    Airway   Mallampati: II  TM distance: >3 FB  Neck ROM: full       Cardiovascular - normal exam  Rhythm: regular  Rate: normal  (-) murmur     Dental - normal exam           Pulmonary - normal exam  Breath sounds clear to auscultation     Abdominal    Neurological - normal exam                   Anesthesia Plan    ASA 2- EMERGENT (Severe BP systolic > 200 despite Mg gtt)   ASA physical status emergent criteria: other (comment)    Plan - spinal   Neuraxial block will be primary anesthetic                Postoperative Plan: Postoperative administration of opioids is intended.    Pertinent diagnostic labs and testing reviewed    Informed Consent:    Anesthetic plan and risks discussed with patient.

## 2024-02-05 NOTE — OP REPORT
DATE OF SERVICE: 2024     PREOPERATIVE DIAGNOSES:  1.  Intrauterine pregnancy at 34w1d  2.  AMA  3.  Preeclampsia with severe features  4.  Inability to control blood pressures  5.  Remote from delivery     POSTOPERATIVE DIAGNOSES:  1.  Intrauterine pregnancy at 34w1d  2.  same    PROCEDURE PERFORMED: Primary low transverse  section.     SURGEON: Heriberto Giron MD     ASSISTANT: Radha Fernando MD     ANESTHESIA:  Spinal.     ANESTHESIOLOGIST: Kamron Pierre DO     SPECIMEN: Placenta to pathology     ESTIMATED BLOOD LOSS: 600 mL     FINDINGS:  A viable male infant, weight 2322 g, Apgars of 6 and 9 in vertex    presentation with clear amniotic fluid.  There was a normal uterus,  Normal left tube and ovary, significantly adherent right fallopian tube and ovary     COMPLICATIONS:  None.     PROCEDURE:  After appropriate consents were obtained, the patient was taken to the operating room where spinal  anesthesia was applied without complications.  The patient was then prepped and draped in the usual sterile manner.  Clamp test on the skin verified adequate anesthesia.  A Pfannenstiel incision was made with a scalpel 3cm superior to the pubic symphysis and extended down to the rectus fascia.  The rectus fascia was incised with the scalpel and tented up. The underlying rectus muscle was  from the fascia first inferiorly and then superiorly using the cabrera scissors.  The rectus muscle was  bluntly in the midline.  The peritoneum was entered bluntly in the midline. The peritoneum incision was extended superiorly and inferiorly with the Metzenbaum scissors with great care to avoid injury to underlying bowel or bladder.  Uri retractor was placed. The vesicouterine peritoneum was tented up and entered with Metzenbaum scissors, and a bladder flap was created.  An incision was made into the lower uterine segment transversely and the incision was extended bluntly.  Amniotomy was performed  and there was noted to be clear amniotic fluid. The Infant's head was grasped and delivered atraumatically followed by the remainder of the body without any complications.  The mouth and nares were suctioned. The cord was doubly clamped and cut and the infant was handed off to awaiting neonatology team.  The placenta was then allowed to spontaneously deliver. The uterus was cleared of clots and debris.  The hysterotomy incision was reapproximated with 1-0 Vicryl suture in a running locked fashion.  There is noted to be some uterine atony, TXA as well as Hemabate given to the patient.  Hemostasis was noted.  The tubes and ovaries were examined and noted to be normal.  The pelvis was irrigated with normal saline. The pericolic gutters were examined and any blood clots were removed.  The Uri retractor was removed. The peritoneum was reapproximated with 3-0 Vicryl suture running.  The rectus muscles were examined and hemostatic.  The fascia was reapproximated with 0 Vicryl suture running.  The subcutaneous fat was irrigated and any small bleeders were bovied for hemostasis.  The subcutaneous fat was then reapproximated with 3-0 Vicryl suture running.  The skin was reapproximated with 4-0 Monocryl suture running. Steri strips and a dressing were placed.  Sponge, needle, instrument, and lap counts were correct x2.  Patient tolerated the procedure well and went to recovery room in stable condition.        ____________________________________  Heriberto Giron MD

## 2024-02-05 NOTE — ANESTHESIA TIME REPORT
Anesthesia Start and Stop Event Times       Date Time Event    2/5/2024 1319 Ready for Procedure     1324 Anesthesia Start     1425 Anesthesia Stop          Responsible Staff  02/05/24      Name Role Begin End    Kamron Pierre D.O. Anesth 1324 1425          Overtime Reason:  no overtime (within assigned shift)    Comments:

## 2024-02-06 LAB
ALBUMIN SERPL BCP-MCNC: 2.8 G/DL (ref 3.2–4.9)
ALBUMIN/GLOB SERPL: 0.9 G/DL
ALP SERPL-CCNC: 193 U/L (ref 30–99)
ALT SERPL-CCNC: 17 U/L (ref 2–50)
ANION GAP SERPL CALC-SCNC: 11 MMOL/L (ref 7–16)
AST SERPL-CCNC: 28 U/L (ref 12–45)
BILIRUB SERPL-MCNC: 0.5 MG/DL (ref 0.1–1.5)
BUN SERPL-MCNC: 10 MG/DL (ref 8–22)
CALCIUM ALBUM COR SERPL-MCNC: 8.3 MG/DL (ref 8.5–10.5)
CALCIUM SERPL-MCNC: 7.3 MG/DL (ref 8.5–10.5)
CHLORIDE SERPL-SCNC: 99 MMOL/L (ref 96–112)
CO2 SERPL-SCNC: 19 MMOL/L (ref 20–33)
CREAT SERPL-MCNC: 0.6 MG/DL (ref 0.5–1.4)
ERYTHROCYTE [DISTWIDTH] IN BLOOD BY AUTOMATED COUNT: 41.7 FL (ref 35.9–50)
GFR SERPLBLD CREATININE-BSD FMLA CKD-EPI: 116 ML/MIN/1.73 M 2
GLOBULIN SER CALC-MCNC: 3 G/DL (ref 1.9–3.5)
GLUCOSE SERPL-MCNC: 100 MG/DL (ref 65–99)
HCT VFR BLD AUTO: 38.2 % (ref 37–47)
HGB BLD-MCNC: 13.8 G/DL (ref 12–16)
MAGNESIUM SERPL-MCNC: 6.8 MG/DL (ref 1.5–2.5)
MAGNESIUM SERPL-MCNC: 6.9 MG/DL (ref 1.5–2.5)
MCH RBC QN AUTO: 30.5 PG (ref 27–33)
MCHC RBC AUTO-ENTMCNC: 36.1 G/DL (ref 32.2–35.5)
MCV RBC AUTO: 84.3 FL (ref 81.4–97.8)
PLATELET # BLD AUTO: 127 K/UL (ref 164–446)
PMV BLD AUTO: 10.8 FL (ref 9–12.9)
POTASSIUM SERPL-SCNC: 4.1 MMOL/L (ref 3.6–5.5)
PROT SERPL-MCNC: 5.8 G/DL (ref 6–8.2)
RBC # BLD AUTO: 4.53 M/UL (ref 4.2–5.4)
SODIUM SERPL-SCNC: 129 MMOL/L (ref 135–145)
WBC # BLD AUTO: 15.3 K/UL (ref 4.8–10.8)

## 2024-02-06 PROCEDURE — 80053 COMPREHEN METABOLIC PANEL: CPT

## 2024-02-06 PROCEDURE — A9270 NON-COVERED ITEM OR SERVICE: HCPCS | Performed by: ANESTHESIOLOGY

## 2024-02-06 PROCEDURE — 83735 ASSAY OF MAGNESIUM: CPT

## 2024-02-06 PROCEDURE — 700102 HCHG RX REV CODE 250 W/ 637 OVERRIDE(OP): Performed by: ANESTHESIOLOGY

## 2024-02-06 PROCEDURE — 700111 HCHG RX REV CODE 636 W/ 250 OVERRIDE (IP): Performed by: ANESTHESIOLOGY

## 2024-02-06 PROCEDURE — 700105 HCHG RX REV CODE 258: Performed by: OBSTETRICS & GYNECOLOGY

## 2024-02-06 PROCEDURE — 85027 COMPLETE CBC AUTOMATED: CPT

## 2024-02-06 PROCEDURE — A9270 NON-COVERED ITEM OR SERVICE: HCPCS

## 2024-02-06 PROCEDURE — 36415 COLL VENOUS BLD VENIPUNCTURE: CPT

## 2024-02-06 PROCEDURE — A9270 NON-COVERED ITEM OR SERVICE: HCPCS | Performed by: OBSTETRICS & GYNECOLOGY

## 2024-02-06 PROCEDURE — 700102 HCHG RX REV CODE 250 W/ 637 OVERRIDE(OP)

## 2024-02-06 PROCEDURE — 700102 HCHG RX REV CODE 250 W/ 637 OVERRIDE(OP): Performed by: STUDENT IN AN ORGANIZED HEALTH CARE EDUCATION/TRAINING PROGRAM

## 2024-02-06 PROCEDURE — 700111 HCHG RX REV CODE 636 W/ 250 OVERRIDE (IP): Performed by: OBSTETRICS & GYNECOLOGY

## 2024-02-06 PROCEDURE — 700102 HCHG RX REV CODE 250 W/ 637 OVERRIDE(OP): Performed by: OBSTETRICS & GYNECOLOGY

## 2024-02-06 PROCEDURE — 770002 HCHG ROOM/CARE - OB PRIVATE (112)

## 2024-02-06 PROCEDURE — A9270 NON-COVERED ITEM OR SERVICE: HCPCS | Performed by: STUDENT IN AN ORGANIZED HEALTH CARE EDUCATION/TRAINING PROGRAM

## 2024-02-06 RX ORDER — NIFEDIPINE 10 MG/1
CAPSULE ORAL
Status: COMPLETED
Start: 2024-02-06 | End: 2024-02-06

## 2024-02-06 RX ORDER — NIFEDIPINE 30 MG/1
60 TABLET, EXTENDED RELEASE ORAL 2 TIMES DAILY
Status: DISCONTINUED | OUTPATIENT
Start: 2024-02-06 | End: 2024-02-06 | Stop reason: HOSPADM

## 2024-02-06 RX ORDER — NIFEDIPINE 10 MG/1
10 CAPSULE ORAL ONCE
Status: COMPLETED | OUTPATIENT
Start: 2024-02-06 | End: 2024-02-06

## 2024-02-06 RX ADMIN — NIFEDIPINE 60 MG: 30 TABLET, FILM COATED, EXTENDED RELEASE ORAL at 07:12

## 2024-02-06 RX ADMIN — NIFEDIPINE 10 MG: 10 CAPSULE ORAL at 03:44

## 2024-02-06 RX ADMIN — ACETAMINOPHEN 1000 MG: 500 TABLET ORAL at 09:52

## 2024-02-06 RX ADMIN — ACETAMINOPHEN 1000 MG: 500 TABLET ORAL at 16:40

## 2024-02-06 RX ADMIN — IBUPROFEN 800 MG: 800 TABLET, FILM COATED ORAL at 22:29

## 2024-02-06 RX ADMIN — MAGNESIUM SULFATE IN WATER 2 G/HR: 40 INJECTION, SOLUTION INTRAVENOUS at 09:43

## 2024-02-06 RX ADMIN — KETOROLAC TROMETHAMINE 15 MG: 15 INJECTION, SOLUTION INTRAMUSCULAR; INTRAVENOUS at 04:09

## 2024-02-06 RX ADMIN — VITAMIN A, VITAMIN C, VITAMIN D, VITAMIN E, THIAMINE, RIBOFLAVIN, NIACIN, VITAMIN B6, FOLIC ACID, VITAMIN B12, CALCIUM, IRON, ZINC, COPPER 1 TABLET: 4000; 120; 400; 22; 1.84; 3; 20; 10; 1; 12; 200; 27; 25; 2 TABLET ORAL at 08:35

## 2024-02-06 RX ADMIN — LEVOTHYROXINE SODIUM 50 MCG: 0.05 TABLET ORAL at 06:10

## 2024-02-06 RX ADMIN — ACETAMINOPHEN 1000 MG: 500 TABLET ORAL at 04:09

## 2024-02-06 RX ADMIN — IBUPROFEN 800 MG: 800 TABLET, FILM COATED ORAL at 16:41

## 2024-02-06 RX ADMIN — KETOROLAC TROMETHAMINE 15 MG: 15 INJECTION, SOLUTION INTRAMUSCULAR; INTRAVENOUS at 09:51

## 2024-02-06 RX ADMIN — SODIUM CHLORIDE, POTASSIUM CHLORIDE, SODIUM LACTATE AND CALCIUM CHLORIDE: 600; 310; 30; 20 INJECTION, SOLUTION INTRAVENOUS at 09:46

## 2024-02-06 RX ADMIN — NIFEDIPINE 60 MG: 30 TABLET, FILM COATED, EXTENDED RELEASE ORAL at 17:46

## 2024-02-06 ASSESSMENT — PAIN DESCRIPTION - PAIN TYPE: TYPE: ACUTE PAIN;SURGICAL PAIN

## 2024-02-06 NOTE — LACTATION NOTE
Name:  Naima   ID Number:  [id number]    Content Discussed:MOB is 37 y/o  who delivered a 34 1/7 gestation infant due to GHTN with magnesuim sulfate infusion. The infusion was just discontinued @1400 today. WIC liaison stopped in room this am and stated the MOB was very drowsy and preferred to be seen at a later time. Pumping and hand expression was not initiated.     MOBs plan is to breastfeed when she can be with her baby. Expained that to initiate breastmilk production and protect it that it is important to start pumping and hand expression. MOB voices understanding and agrees to instruction. With Radha SON present. Teach hand expression and then pumping with settings @80 for 2 minutes then lowered to 60 speed, suction @comfortable level between 20-40% for a toatl of 15 minutes every three hours followed by 2-5 minutes of hand expression as demonstrated. Teach Cleaning of parts after each use. As colostrum is seen, even drops, take to NICU as soon as pumped. If family not present, Call for staff to take to NICU.     Teach to call for assist as needed. Family voices understanding.

## 2024-02-06 NOTE — PROGRESS NOTES
0700 Report received from Lilia SON.  1245 Patient reports feeling dizzy and tired. DTR's +2 to +3, Dr. Hawkins notified. Orders received to reduce Magnesium to 1g/hr.   1315 Patient denies dizziness but states she is tired.   1530 Shira Ferris RN at bedside for assistance with pumping and hand expression. All patient questions answered.   1624 Orders received from Dr. Hawkins for patient to go upstairs to postpartum once a room is available.  1815 Patient transferred to  in stable condition. Report given to Guera SON.

## 2024-02-06 NOTE — CARE PLAN
The patient is Watcher - Medium risk of patient condition declining or worsening    Shift Goals  Clinical Goals: Control BPs, stable VSS  Patient Goals: Pain control; rest  Family Goals: Support      Problem: Psychosocial - L&D  Goal: Spiritual and cultural needs incorporated into hospitalization  Outcome: Progressing  Flowsheets (Taken 2/6/2024 4193)  Incorporate Spiritual/Cultural Needs: Encouraged verbilization of feelings, concerns, expectations and needs     Problem: Pain  Goal: Patient's pain will be alleviated or reduced to the patient’s comfort goal  Outcome: Progressing     Problem: Risk for Fluid Imbalance  Goal: Patient's fluid volume balance will be maintained or improve  Outcome: Progressing     Problem: Risk for Venous Thromboembolism (VTE)  Goal: VTE prevention measures will be implemented and patient will remain free from VTE  Outcome: Progressing

## 2024-02-06 NOTE — PROGRESS NOTES
"Dulce Lutz  POD 1    Subjective: 38-year-old  2 para 0-2-0-2, status post primary low-transverse  due to preeclampsia with severe features and inability to control blood pressures.  Patient had significant issues with elevated blood pressures throughout the day yesterday.  Required numerous doses of IV as well as oral antihypertensives.  Currently on nifedipine XL 60 mg p.o. twice daily.  Mag sulfate still infusing.  Labs within normal limits.        BP (!) 141/87   Pulse 94   Temp 36.3 °C (97.4 °F) (Temporal)   Resp 16   Ht 1.575 m (5' 2\")   Wt 84.4 kg (186 lb)   SpO2 92%   Breast tenderness no, Engorgement no, Mastitis no  CVS: RRR  Resp: CTA bilaterally  Abdomen: Abdomen soft, non-tender. BS normal. No masses,  No organomegaly  Incision: clean/dry/intact, dressing in place,   Fundus: Tender no, below umbilicus Yes,   Extremities: +2 edema    Meds:   No current facility-administered medications on file prior to encounter.     No current outpatient medications on file prior to encounter.       Lab:   Recent Results (from the past 48 hour(s))   PROTEIN/CREAT RATIO URINE    Collection Time: 24 10:55 AM   Result Value Ref Range    Total Protein, Urine 144.0 (H) 0.0 - 15.0 mg/dL    Creatinine, Random Urine 16.79 mg/dL    Protein Creatinine Ratio 8577 (H) 10 - 107 mg/g   CBC WITH DIFFERENTIAL    Collection Time: 24 11:14 AM   Result Value Ref Range    WBC 9.2 4.8 - 10.8 K/uL    RBC 4.85 4.20 - 5.40 M/uL    Hemoglobin 14.5 12.0 - 16.0 g/dL    Hematocrit 42.4 37.0 - 47.0 %    MCV 87.4 81.4 - 97.8 fL    MCH 29.9 27.0 - 33.0 pg    MCHC 34.2 32.2 - 35.5 g/dL    RDW 43.1 35.9 - 50.0 fL    Platelet Count 132 (L) 164 - 446 K/uL    MPV 11.3 9.0 - 12.9 fL    Neutrophils-Polys 62.10 44.00 - 72.00 %    Lymphocytes 28.20 22.00 - 41.00 %    Monocytes 8.40 0.00 - 13.40 %    Eosinophils 0.50 0.00 - 6.90 %    Basophils 0.30 0.00 - 1.80 %    Immature Granulocytes 0.50 0.00 - 0.90 %    Nucleated " RBC 0.20 0.00 - 0.20 /100 WBC    Neutrophils (Absolute) 5.71 1.82 - 7.42 K/uL    Lymphs (Absolute) 2.59 1.00 - 4.80 K/uL    Monos (Absolute) 0.77 0.00 - 0.85 K/uL    Eos (Absolute) 0.05 0.00 - 0.51 K/uL    Baso (Absolute) 0.03 0.00 - 0.12 K/uL    Immature Granulocytes (abs) 0.05 0.00 - 0.11 K/uL    NRBC (Absolute) 0.02 K/uL   Comp Metabolic Panel    Collection Time: 02/05/24 11:14 AM   Result Value Ref Range    Sodium 138 135 - 145 mmol/L    Potassium 4.1 3.6 - 5.5 mmol/L    Chloride 108 96 - 112 mmol/L    Co2 19 (L) 20 - 33 mmol/L    Anion Gap 11.0 7.0 - 16.0    Glucose 92 65 - 99 mg/dL    Bun 15 8 - 22 mg/dL    Creatinine 0.69 0.50 - 1.40 mg/dL    Calcium 8.9 8.5 - 10.5 mg/dL    Correct Calcium 9.9 8.5 - 10.5 mg/dL    AST(SGOT) 15 12 - 45 U/L    ALT(SGPT) 12 2 - 50 U/L    Alkaline Phosphatase 203 (H) 30 - 99 U/L    Total Bilirubin 0.3 0.1 - 1.5 mg/dL    Albumin 2.8 (L) 3.2 - 4.9 g/dL    Total Protein 6.1 6.0 - 8.2 g/dL    Globulin 3.3 1.9 - 3.5 g/dL    A-G Ratio 0.8 g/dL   MAGNESIUM    Collection Time: 02/05/24 11:14 AM   Result Value Ref Range    Magnesium 2.0 1.5 - 2.5 mg/dL   ESTIMATED GFR    Collection Time: 02/05/24 11:14 AM   Result Value Ref Range    GFR (CKD-EPI) 113 >60 mL/min/1.73 m 2   T.PALLIDUM AB CARY (Syphilis)    Collection Time: 02/05/24 11:35 AM   Result Value Ref Range    Syphilis, Treponemal Qual Non-Reactive Non-Reactive   Histology Request    Collection Time: 02/05/24  1:47 PM   Result Value Ref Range    Pathology Request Sent to Year Up    [facility] Blood Bank Specimen (Not Tested)    Collection Time: 02/05/24  4:01 PM   Result Value Ref Range    Holding Tube - Bb DONE    COD - Adult (Type and Screen)    Collection Time: 02/05/24  4:01 PM   Result Value Ref Range    ABO Grouping Only A     Rh Grouping Only POS     Antibody Screen-Cod NEG    SERUM MAGNESIUM LEVELS EVERY 6 HRS UNTIL DESIRED RANGE (5-8 MG/DL) ACHIEVED    Collection Time: 02/05/24  4:01 PM   Result Value Ref Range    Magnesium 4.3 (H)  1.5 - 2.5 mg/dL   Serum magnesium levels every 6 hours until desired range( 5-8mg/dl) is achieved while infusion is running    Collection Time: 02/05/24  9:48 PM   Result Value Ref Range    Magnesium 6.2 (H) 1.5 - 2.5 mg/dL   CBC without differential- Once in AM regardless of delivery time    Collection Time: 02/06/24  3:50 AM   Result Value Ref Range    WBC 15.3 (H) 4.8 - 10.8 K/uL    RBC 4.53 4.20 - 5.40 M/uL    Hemoglobin 13.8 12.0 - 16.0 g/dL    Hematocrit 38.2 37.0 - 47.0 %    MCV 84.3 81.4 - 97.8 fL    MCH 30.5 27.0 - 33.0 pg    MCHC 36.1 (H) 32.2 - 35.5 g/dL    RDW 41.7 35.9 - 50.0 fL    Platelet Count 127 (L) 164 - 446 K/uL    MPV 10.8 9.0 - 12.9 fL   Comp Metabolic Panel    Collection Time: 02/06/24  3:50 AM   Result Value Ref Range    Sodium 129 (L) 135 - 145 mmol/L    Potassium 4.1 3.6 - 5.5 mmol/L    Chloride 99 96 - 112 mmol/L    Co2 19 (L) 20 - 33 mmol/L    Anion Gap 11.0 7.0 - 16.0    Glucose 100 (H) 65 - 99 mg/dL    Bun 10 8 - 22 mg/dL    Creatinine 0.60 0.50 - 1.40 mg/dL    Calcium 7.3 (L) 8.5 - 10.5 mg/dL    Correct Calcium 8.3 (L) 8.5 - 10.5 mg/dL    AST(SGOT) 28 12 - 45 U/L    ALT(SGPT) 17 2 - 50 U/L    Alkaline Phosphatase 193 (H) 30 - 99 U/L    Total Bilirubin 0.5 0.1 - 1.5 mg/dL    Albumin 2.8 (L) 3.2 - 4.9 g/dL    Total Protein 5.8 (L) 6.0 - 8.2 g/dL    Globulin 3.0 1.9 - 3.5 g/dL    A-G Ratio 0.9 g/dL   MAGNESIUM    Collection Time: 02/06/24  3:50 AM   Result Value Ref Range    Magnesium 6.9 (H) 1.5 - 2.5 mg/dL   ESTIMATED GFR    Collection Time: 02/06/24  3:50 AM   Result Value Ref Range    GFR (CKD-EPI) 116 >60 mL/min/1.73 m 2       Assessment and Plan  POD# 1 s/p primary LTCS at 34 weeks gestation  Doing well postpartum, meeting all postop milestones  Blood pressure still elevated    Continue Routine postpartum care  Ambulation encouraged     Will continue magnesium sulfate for 24 hours following delivery, wounds continue at that time.  Labs do not show any evidence of endorgan  dysfunction.  Urine output adequate.  Continue to manage blood pressure aggressively.  Nifedipine has been increased to Procardia XL 60 mg p.o. twice daily.    All questions answered    Infant in NICU

## 2024-02-06 NOTE — PROGRESS NOTES
1915: Report from Nurys SON. Pt and FOB transferred from PACU 3 to S232 via gurney in stable condition.  1928: Ok for pt to eat dinner per Dr. Giron.  2005: Dr. Giron updated on BP and HR. Orders received for antihypertensive medication, see MAR.  2036: Dr. Giron updated on BP and HR. Orders received for hourly BP and for PO antihypertensive med to be given at 2200, see MAR.  0338: Dr. Giron updated on BP. Orders received for antihypertensive medication, see MAR.  0406: Dr. Giron updated on BP. Orders received for hourly BP.  1900: Report to Radha NESS RN. POC discussed.

## 2024-02-06 NOTE — CARE PLAN
SUBJECTIVE:  Carolynn Messer is a 9 year old White female who is here today with a chief complaint of Congestion (chest, using inhaler every 4 hours due to short of breath, started friday, right ear pain, unknown if fever. )  .    Carolynn is here accompanied by her day. Concerns as listed above.   Symptom onset: Friday. Nasal congestion, runny nose. Saturday developed SOB. Using inhaler every 4 hours. Cough dry. Feels like she is wheezing. Right ear pain last night. No exposure to strept throat. Mom has bronchitis.     I have reviewed the patient's medications and allergies, past medical, surgical, social and family history, updating these as appropriate.  See Histories section of the EMR for a display of this information.   Past Medical History:   Diagnosis Date   • Concussion 11/2017        History reviewed. No pertinent surgical history.      Current Outpatient Medications on File Prior to Visit:  albuterol 108 (90 Base) MCG/ACT inhaler   triamcinolone (ARISTOCORT) 0.1 % cream   acetaminophen (TYLENOL) 500 MG tablet   ibuprofen (IBUPROFEN CHILDRENS) 100 MG/5ML suspension   Fexofenadine HCl (ALLEGRA PO)   Fluticasone Propionate (FLONASE NA)     No current facility-administered medications on file prior to visit.        REVIEW OF SYSTEMS:  Constitutional:  Denies chills. Has a chill.   Eyes:  Denies change in visual acuity.   HENT:  Denies sore throat. Right ear pain is worse than left.   Cardiovascular:  Describes chest tightness.   Gastrointestinal:  Denies abdominal pain, nausea, vomiting, bloody stools or diarrhea. Appetite OK. Had pizza. Drinking water. It does hurt to swallow.   Genitourinary:  Denies dysuria.   Musculoskeletal:  Denies back pain or joint pain.   Integument:  Denies rash.  Neurologic:  Denies headaches. Feels a touch dizzy.   Endocrine:  Denies polyuria or polydipsia.   Lymphatic:  Denies swollen glands.      PHYSICAL EXAMINATION:  General:  Patient appears tired. Skin hot, dry.   Vitals:   The patient is Watcher - Medium risk of patient condition declining or worsening    Shift Goals  Clinical Goals: Maintain BP's WDL  Patient Goals: Pain control, rest  Family Goals: Support    Progress made toward(s) clinical / shift goals:    Problem: Pain  Goal: Patient's pain will be alleviated or reduced to the patient’s comfort goal  Outcome: Progressing     Problem: Risk for Fluid Imbalance  Goal: Patient's fluid volume balance will be maintained or improve  Outcome: Progressing            Visit Vitals  /55   Pulse 131   Temp 101.2 °F (38.4 °C)   Resp 24   Ht 4' 8.5\" (1.435 m)   Wt 41.3 kg   SpO2 94%   BMI 20.04 kg/m²   Heet: Sinuses are non tender. Left external auditory canal clear, right TM is red, swollen. No tragal tenderness. Mild pre-auricular adenopathy. Oropharynx is clear, tonsils are 2 plus.   Neck:  Thin, supple without lymphadenopathy.  Cardiovascular:  Regular rhythm, normal sounds and absence of murmurs, rubs or gallops.  Lungs:  Normal respiratory excursion with clear lungs, no crackles and wheezing. No cough in the exam room.   Abdomen:  Soft, active bowel sounds, no hepatosplenomegaly. No masses, tenderness, rebound or guarding.  Extremities:  Ankles bilaterally free of edema.   Neurologic:  Alert and oriented times 3. No tics or tremors.  Psychiatric: Affect is broad, good eye contact. Insight and thought processes are appropriate to situation.    LAST 4 WEIGHTS:  Wt Readings from Last 4 Encounters:   05/21/19 41.3 kg (75 %, Z= 0.67)*   03/11/19 41.3 kg (78 %, Z= 0.78)*   02/27/19 40.8 kg (77 %, Z= 0.75)*   01/27/19 40.7 kg (78 %, Z= 0.79)*     * Growth percentiles are based on CDC (Girls, 2-20 Years) data.       ASSESSMENT & PLAN:  1. Acute suppurative otitis media of right ear without spontaneous rupture of tympanic membrane, recurrence not specified  2. Acute bronchitis, unspecified organism  Increase fluids. Alternate acetaminophen with ibuprofen if fevers greater than 102. Start amoxicillin, take as directed. Continue use of albuterol. Lungs clear at this time with no wheezing.     There are no preventive care reminders to display for this patient.    Return if symptoms worsen or fail to improve.

## 2024-02-07 PROCEDURE — A9270 NON-COVERED ITEM OR SERVICE: HCPCS | Performed by: OBSTETRICS & GYNECOLOGY

## 2024-02-07 PROCEDURE — 770002 HCHG ROOM/CARE - OB PRIVATE (112)

## 2024-02-07 PROCEDURE — 700102 HCHG RX REV CODE 250 W/ 637 OVERRIDE(OP): Performed by: STUDENT IN AN ORGANIZED HEALTH CARE EDUCATION/TRAINING PROGRAM

## 2024-02-07 PROCEDURE — 700102 HCHG RX REV CODE 250 W/ 637 OVERRIDE(OP): Performed by: OBSTETRICS & GYNECOLOGY

## 2024-02-07 PROCEDURE — A9270 NON-COVERED ITEM OR SERVICE: HCPCS | Performed by: STUDENT IN AN ORGANIZED HEALTH CARE EDUCATION/TRAINING PROGRAM

## 2024-02-07 RX ORDER — NIFEDIPINE 30 MG/1
30 TABLET, EXTENDED RELEASE ORAL
Status: DISCONTINUED | OUTPATIENT
Start: 2024-02-08 | End: 2024-02-07

## 2024-02-07 RX ORDER — NIFEDIPINE 30 MG/1
60 TABLET, EXTENDED RELEASE ORAL
Status: DISCONTINUED | OUTPATIENT
Start: 2024-02-07 | End: 2024-02-07

## 2024-02-07 RX ORDER — NIFEDIPINE 10 MG/1
10 CAPSULE ORAL ONCE
Status: COMPLETED | OUTPATIENT
Start: 2024-02-07 | End: 2024-02-07

## 2024-02-07 RX ORDER — NIFEDIPINE 30 MG/1
30 TABLET, EXTENDED RELEASE ORAL
Status: DISCONTINUED | OUTPATIENT
Start: 2024-02-07 | End: 2024-02-08

## 2024-02-07 RX ADMIN — LEVOTHYROXINE SODIUM 50 MCG: 0.05 TABLET ORAL at 06:29

## 2024-02-07 RX ADMIN — IBUPROFEN 800 MG: 800 TABLET, FILM COATED ORAL at 13:46

## 2024-02-07 RX ADMIN — VITAMIN A, VITAMIN C, VITAMIN D, VITAMIN E, THIAMINE, RIBOFLAVIN, NIACIN, VITAMIN B6, FOLIC ACID, VITAMIN B12, CALCIUM, IRON, ZINC, COPPER 1 TABLET: 4000; 120; 400; 22; 1.84; 3; 20; 10; 1; 12; 200; 27; 25; 2 TABLET ORAL at 08:04

## 2024-02-07 RX ADMIN — ACETAMINOPHEN 1000 MG: 500 TABLET ORAL at 19:56

## 2024-02-07 RX ADMIN — ACETAMINOPHEN 1000 MG: 500 TABLET ORAL at 08:05

## 2024-02-07 RX ADMIN — NIFEDIPINE 30 MG: 30 TABLET, FILM COATED, EXTENDED RELEASE ORAL at 19:56

## 2024-02-07 RX ADMIN — ACETAMINOPHEN 1000 MG: 500 TABLET ORAL at 01:07

## 2024-02-07 RX ADMIN — IBUPROFEN 800 MG: 800 TABLET, FILM COATED ORAL at 06:29

## 2024-02-07 RX ADMIN — DOCUSATE SODIUM 100 MG: 100 CAPSULE, LIQUID FILLED ORAL at 08:04

## 2024-02-07 RX ADMIN — IBUPROFEN 800 MG: 800 TABLET, FILM COATED ORAL at 22:04

## 2024-02-07 RX ADMIN — ACETAMINOPHEN 1000 MG: 500 TABLET ORAL at 13:46

## 2024-02-07 RX ADMIN — OXYCODONE 5 MG: 5 TABLET ORAL at 16:42

## 2024-02-07 RX ADMIN — NIFEDIPINE 10 MG: 10 CAPSULE ORAL at 13:46

## 2024-02-07 RX ADMIN — NIFEDIPINE 60 MG: 30 TABLET, FILM COATED, EXTENDED RELEASE ORAL at 08:04

## 2024-02-07 ASSESSMENT — PAIN DESCRIPTION - PAIN TYPE
TYPE: SURGICAL PAIN
TYPE: ACUTE PAIN;SURGICAL PAIN
TYPE: SURGICAL PAIN
TYPE: SURGICAL PAIN
TYPE: ACUTE PAIN
TYPE: ACUTE PAIN;SURGICAL PAIN
TYPE: ACUTE PAIN;SURGICAL PAIN
TYPE: SURGICAL PAIN
TYPE: SURGICAL PAIN

## 2024-02-07 NOTE — CARE PLAN
Problem: Pain  Goal: Patient's pain will be alleviated or reduced to the patient’s comfort goal  Outcome: Progressing     Problem: Pain - Standard  Goal: Alleviation of pain or a reduction in pain to the patient’s comfort goal  Outcome: Progressing   The patient is Watcher - Medium risk of patient condition declining or worsening    Shift Goals  Clinical Goals: stable VS  Patient Goals: pain management  Family Goals: visit infant in NICU    Progress made toward(s) clinical / shift goals:  Pt educated on pain scale and pain medication. Pain medication is being administered as ordered. Pain being alleviated to pt satisfaction thus far.

## 2024-02-07 NOTE — PROGRESS NOTES
Report received from Radha SON @ 0481. Assumed care. The patient is alert and oriented. Family is at the beside. Oriented the patient and family to the room. Discussed plan of care. Encouraged ambulation. Assessment done. Call light is within reach. Encouraged to call if with need.

## 2024-02-07 NOTE — LACTATION NOTE
Follow-up LC visit, mother has pumped once, declines pump settings review. Reviewed importance of consistent pump use to establish milk production and recommended routine pumping every 3 hours. Provided soap for washing pump parts and handouts in Belgian on washing of pump parts and collection and storage of breast milk. Mother enrolled with [facility] services today. Mother denies questions/concerns. Consult with  Diana # [id number] for Belgian language.

## 2024-02-07 NOTE — PROGRESS NOTES
0240 RN notified CNM of elevated BP's 143/89 and 145/90 within 24 hours. CNM indicated nifedipine was to be continued as ordered. No further orders at this time.

## 2024-02-07 NOTE — DISCHARGE PLANNING
Discharge Planning Assessment Post Partum    Reason for Referral: NICU  Address: [address] [facility]  Type of Living Situation:Stable  Mom Diagnosis: Postpartum  Baby Diagnosis: NICU 34.3  Primary Language: Pashto     Name of Baby: Mitch Obando  Father of the Baby: Mitch Obando  Involved in baby’s care? Yes  Contact Information: [phone]    Prenatal Care: Yes  Mom's PCP: None  PCP for new baby:List given    Support System: Yes  Coping/Bonding between mother & baby: MOB coping/bonding   Source of Feeding: Breast  Supplies for Infant: Yes    Mom's Insurance: Medicaid   Baby Covered on Insurance:Yes  Mother Employed/School: None  Other children in the home/names & ages: 12 yr old     Financial Hardship/Income: None   Mom's Mental status: Stable and alert   Services used prior to admit: None    CPS History: None  Psychiatric History: None  Domestic Violence History: None  Drug/ETOH History: None    Resources Provided:  provided pediatrician list   Referrals Made: None     Clearance for Discharge: Baby is cleared to discharge with MOB and FOB when medically cleared      Ongoing Plan: will continue to follow

## 2024-02-07 NOTE — PROGRESS NOTES
" Post Partum C/S Progress Note  2 Days Post-Op    Subjective:  Patient is a 38-year-old  2 para 0-2-0-2 who had a primary low-transverse  at 34 weeks and 1 day estimated gestational age due to preeclampsia with severe features.    Magnesium sulfate was discontinued yesterday afternoon at 2:30.   Pain controlled on current medication.  Voiding without difficulty, normal lochia.    Vitals: /82   Pulse 99   Temp 36.7 °C (98 °F) (Temporal)   Resp 16   Ht 1.575 m (5' 2\")   Wt 84.4 kg (186 lb)   LMP  (LMP Unknown)   SpO2 96%   Breastfeeding Yes   BMI 34.02 kg/m²   Temp (24hrs), Av.8 °C (98.2 °F), Min:36.6 °C (97.9 °F), Max:37.1 °C (98.7 °F)      I&O:    Intake/Output Summary (Last 24 hours) at 2024 0622  Last data filed at 2024 0115  Gross per 24 hour   Intake --   Output 3900 ml   Net -3900 ml       Exam:    GEN: Patient without distress.  Abd: soft, fundus firm at level of umbilicus, non tender.    Incision: clean and dry without erythema.  Ext: negative for swelling, cords or tenderness.    Labs:   Recent Labs     24  1114 24  0350   WBC 9.2 15.3*   RBC 4.85 4.53   HEMOGLOBIN 14.5 13.8   HEMATOCRIT 42.4 38.2   MCV 87.4 84.3   MCH 29.9 30.5   RDW 43.1 41.7   PLATELETCT 132* 127*   MPV 11.3 10.8   NEUTSPOLYS 62.10  --    LYMPHOCYTES 28.20  --    MONOCYTES 8.40  --    EOSINOPHILS 0.50  --    BASOPHILS 0.30  --          Assesment: 38 y.o. @GP s/p LTCS      2 Days Post-Op   - Continue routine post-op care and maternal education.    2.  Elevated blood pressures are now controlled on nifedipine XL 60 mg twice daily  3.  Baby is in the NICU due to premature birth at 34 weeks 1 day estimated gestational age  4.  Evaluate for possible discharge postop day 3 or postop day 4  5. Pt states she has blood when passing urine vs VICTOR MANUEL michaud RN who will evaluate and if hemeturia will check for UTI, no dysuria  6. Pt has not had BM yet, will give stool softener if no results    Signed " By:   Shanthi Hawkins M.D.

## 2024-02-08 ENCOUNTER — ANESTHESIA (OUTPATIENT)
Dept: ANESTHESIOLOGY | Facility: MEDICAL CENTER | Age: 39
End: 2024-02-08
Payer: MEDICAID

## 2024-02-08 LAB
ERYTHROCYTE [DISTWIDTH] IN BLOOD BY AUTOMATED COUNT: 45 FL (ref 35.9–50)
HCT VFR BLD AUTO: 38.5 % (ref 37–47)
HGB BLD-MCNC: 13.5 G/DL (ref 12–16)
MCH RBC QN AUTO: 30.5 PG (ref 27–33)
MCHC RBC AUTO-ENTMCNC: 35.1 G/DL (ref 32.2–35.5)
MCV RBC AUTO: 86.9 FL (ref 81.4–97.8)
PLATELET # BLD AUTO: 129 K/UL (ref 164–446)
PMV BLD AUTO: 9.9 FL (ref 9–12.9)
RBC # BLD AUTO: 4.43 M/UL (ref 4.2–5.4)
WBC # BLD AUTO: 12 K/UL (ref 4.8–10.8)

## 2024-02-08 PROCEDURE — 700102 HCHG RX REV CODE 250 W/ 637 OVERRIDE(OP): Performed by: NURSE PRACTITIONER

## 2024-02-08 PROCEDURE — 770002 HCHG ROOM/CARE - OB PRIVATE (112)

## 2024-02-08 PROCEDURE — 700102 HCHG RX REV CODE 250 W/ 637 OVERRIDE(OP): Performed by: OBSTETRICS & GYNECOLOGY

## 2024-02-08 PROCEDURE — 700102 HCHG RX REV CODE 250 W/ 637 OVERRIDE(OP)

## 2024-02-08 PROCEDURE — 85027 COMPLETE CBC AUTOMATED: CPT

## 2024-02-08 PROCEDURE — A9270 NON-COVERED ITEM OR SERVICE: HCPCS | Performed by: NURSE PRACTITIONER

## 2024-02-08 PROCEDURE — A9270 NON-COVERED ITEM OR SERVICE: HCPCS | Performed by: STUDENT IN AN ORGANIZED HEALTH CARE EDUCATION/TRAINING PROGRAM

## 2024-02-08 PROCEDURE — A9270 NON-COVERED ITEM OR SERVICE: HCPCS

## 2024-02-08 PROCEDURE — 36415 COLL VENOUS BLD VENIPUNCTURE: CPT

## 2024-02-08 PROCEDURE — 700102 HCHG RX REV CODE 250 W/ 637 OVERRIDE(OP): Performed by: STUDENT IN AN ORGANIZED HEALTH CARE EDUCATION/TRAINING PROGRAM

## 2024-02-08 PROCEDURE — A9270 NON-COVERED ITEM OR SERVICE: HCPCS | Performed by: OBSTETRICS & GYNECOLOGY

## 2024-02-08 RX ORDER — LABETALOL 100 MG/1
200 TABLET, FILM COATED ORAL ONCE
Status: COMPLETED | OUTPATIENT
Start: 2024-02-08 | End: 2024-02-08

## 2024-02-08 RX ORDER — NIFEDIPINE 30 MG/1
60 TABLET, EXTENDED RELEASE ORAL
Status: DISCONTINUED | OUTPATIENT
Start: 2024-02-08 | End: 2024-02-08

## 2024-02-08 RX ORDER — NIFEDIPINE 30 MG/1
30 TABLET, EXTENDED RELEASE ORAL EVERY EVENING
Status: DISCONTINUED | OUTPATIENT
Start: 2024-02-08 | End: 2024-02-09 | Stop reason: HOSPADM

## 2024-02-08 RX ORDER — NIFEDIPINE 30 MG/1
30 TABLET, EXTENDED RELEASE ORAL
Status: DISCONTINUED | OUTPATIENT
Start: 2024-02-08 | End: 2024-02-08

## 2024-02-08 RX ORDER — NIFEDIPINE 30 MG/1
30 TABLET, EXTENDED RELEASE ORAL 2 TIMES DAILY
Status: DISCONTINUED | OUTPATIENT
Start: 2024-02-08 | End: 2024-02-08

## 2024-02-08 RX ORDER — NIFEDIPINE 30 MG/1
60 TABLET, EXTENDED RELEASE ORAL EVERY MORNING
Status: DISCONTINUED | OUTPATIENT
Start: 2024-02-08 | End: 2024-02-09 | Stop reason: HOSPADM

## 2024-02-08 RX ORDER — LABETALOL 100 MG/1
200 TABLET, FILM COATED ORAL TWICE DAILY
Status: DISCONTINUED | OUTPATIENT
Start: 2024-02-08 | End: 2024-02-08

## 2024-02-08 RX ORDER — NIFEDIPINE 10 MG/1
10 CAPSULE ORAL ONCE
Status: COMPLETED | OUTPATIENT
Start: 2024-02-08 | End: 2024-02-08

## 2024-02-08 RX ADMIN — LEVOTHYROXINE SODIUM 50 MCG: 0.05 TABLET ORAL at 06:04

## 2024-02-08 RX ADMIN — ACETAMINOPHEN 1000 MG: 500 TABLET ORAL at 08:53

## 2024-02-08 RX ADMIN — ACETAMINOPHEN 1000 MG: 500 TABLET ORAL at 00:13

## 2024-02-08 RX ADMIN — NIFEDIPINE 30 MG: 30 TABLET, FILM COATED, EXTENDED RELEASE ORAL at 18:05

## 2024-02-08 RX ADMIN — IBUPROFEN 800 MG: 800 TABLET, FILM COATED ORAL at 06:04

## 2024-02-08 RX ADMIN — ACETAMINOPHEN 1000 MG: 500 TABLET ORAL at 13:49

## 2024-02-08 RX ADMIN — NIFEDIPINE 10 MG: 10 CAPSULE ORAL at 03:21

## 2024-02-08 RX ADMIN — IBUPROFEN 800 MG: 800 TABLET, FILM COATED ORAL at 21:58

## 2024-02-08 RX ADMIN — LABETALOL HYDROCHLORIDE 200 MG: 100 TABLET, FILM COATED ORAL at 11:54

## 2024-02-08 RX ADMIN — IBUPROFEN 800 MG: 800 TABLET, FILM COATED ORAL at 13:49

## 2024-02-08 RX ADMIN — NIFEDIPINE 60 MG: 30 TABLET, FILM COATED, EXTENDED RELEASE ORAL at 06:04

## 2024-02-08 RX ADMIN — VITAMIN A, VITAMIN C, VITAMIN D, VITAMIN E, THIAMINE, RIBOFLAVIN, NIACIN, VITAMIN B6, FOLIC ACID, VITAMIN B12, CALCIUM, IRON, ZINC, COPPER 1 TABLET: 4000; 120; 400; 22; 1.84; 3; 20; 10; 1; 12; 200; 27; 25; 2 TABLET ORAL at 08:54

## 2024-02-08 RX ADMIN — LABETALOL HYDROCHLORIDE 200 MG: 100 TABLET, FILM COATED ORAL at 06:03

## 2024-02-08 RX ADMIN — ACETAMINOPHEN 1000 MG: 500 TABLET ORAL at 20:03

## 2024-02-08 RX ADMIN — LABETALOL HYDROCHLORIDE 400 MG: 300 TABLET, FILM COATED ORAL at 18:04

## 2024-02-08 ASSESSMENT — PAIN DESCRIPTION - PAIN TYPE
TYPE: SURGICAL PAIN
TYPE: ACUTE PAIN;SURGICAL PAIN
TYPE: ACUTE PAIN;SURGICAL PAIN
TYPE: SURGICAL PAIN

## 2024-02-08 NOTE — PROGRESS NOTES
RN notified CNM of elevated pt pressure of 149/101. CM contacted MD Smiley for further directives. Per CNM, Dr. Smiley Ok with BP. Increase of Procardia 30mg XL to BID from Q daily. No further orders.

## 2024-02-08 NOTE — PROGRESS NOTES
Assessment completed, fundus firm, lochia light, abdominal incision with dressing intact, plan of care reviewed, verbalized understanding, denies pain at this time. Will call if pain medication intervention is needed

## 2024-02-08 NOTE — PROGRESS NOTES
1330 Patient resting in bed. Denies head ache, vision issue or any discomfort. MD Smiley called with patients 163/111 and 166/111 blood pressures and patient status. Procardia IR ordered, given.     1845 MD Smiley called with patients 161/107 blood pressure. Blood pressure to be rechecked in 15 min and call MD back with results.     1920 MD Smiley notified of repeat blood pressure. Procardia ordered.

## 2024-02-08 NOTE — CARE PLAN
The patient is Stable - Low risk of patient condition declining or worsening    Shift Goals  Clinical Goals: Maintain acceptable pain level  Patient Goals: pain management  Family Goals: visit infant in NICU      Problem: Infection - Postpartum  Goal: Postpartum patient will be free of signs and symptoms of infection  Outcome: Progressing  Note: VSS. No signs or symptoms of infection noted or reported. Lab results within defined limits.     Problem: Altered Physiologic Condition  Goal: Patient physiologically stable as evidenced by normal lochia, palpable uterine involution and vitals within normal limits  Outcome: Progressing  Note: Fundus firm, lochia scant

## 2024-02-08 NOTE — CARE PLAN
Problem: Pain - Standard  Goal: Alleviation of pain or a reduction in pain to the patient’s comfort goal  Outcome: Progressing     Problem: Infection - Postpartum  Goal: Postpartum patient will be free of signs and symptoms of infection  Outcome: Progressing   The patient is Stable - Low risk of patient condition declining or worsening    Shift Goals  Clinical Goals: stable BP  Patient Goals: pain management  Family Goals: visit infant in NICU    Progress made toward(s) clinical / shift goals:  Pt pain medication being administered as ordered. Pt educated on pain medication and pain scale. Pt vitals are within defined limits and stable. No evidence of pt infection at this time.

## 2024-02-08 NOTE — LACTATION NOTE
This note was copied from a baby's chart.  Follow up lactation support : Mom reports that she pumped last night and has not pumped ever since.  LC reviewed supply and demand and importance of Q 3 hour pumping to stimulate milk supply from maximum results. Mom did nurse her first child. LC reviewed pump settings with mom and wrote them on the white board for reference  Mom does not have a pump at home and carries [employer]. LC encouraged mom to call WIC office today to arrange for pump  tomorrow. Manual pimp will be order for mom.

## 2024-02-08 NOTE — PROGRESS NOTES
1845 Obtained report from ADELAIDA Brown. Pt assessment completed. RN noted elevated BP. Procardia given as ordered by MD. BP to continue to be monitored and MD to be notified as necessary. All pt needs and questions addressed at this time as well.

## 2024-02-08 NOTE — PROGRESS NOTES
Post Partum Progress Note    Name:   Dulce Lutz   Date/Time:  2024 - 5:56 AM  Chief Admitting Dx:  Pre-eclampsia affecting childbirth [O14.94]  Delivery Type:   for preE with severe features  Post-Op/Post Partum Days #:  3    Subjective:  Abdominal pain: no  Ambulating:   yes  Tolerating liquids:  yes  Tolerating food:  yes common adult  Flatus:   yes  BM:    yes  Bleeding:   with a small amount of bleeding  Voiding:   yes  Dizziness:   no  Feeding:   Pumping-baby in NICU    Vitals:    24 1915 24 2200 24 0200 24 0531   BP: (!) 167/107 (!) 149/101 (!) 179/115 (!) 163/115   Pulse: 98 87 91 (!) 109   Resp:  17 18 18   Temp:  36.7 °C (98 °F) 36.7 °C (98 °F) 36.8 °C (98.2 °F)   TempSrc:  Temporal Temporal Temporal   SpO2:  95% 96% 95%   Weight:       Height:           Exam:  Breast: Tenderness no  Abdomen: Abdomen soft, non-tender. BS normal. No masses,  No organomegaly  Fundal Tenderness:  no  Fundus Firm: yes  Incision: dry and intact  Below umbilicus: yes  Perineum: perineum intact  Lochia: mild  Extremities: Normal extremities, peripheral pulses and reflexes normal    Meds:  Current Facility-Administered Medications   Medication Dose    labetalol (Normodyne) tablet 200 mg  200 mg    LR infusion      oxytocin (Pitocin) infusion (for post delivery)  125 mL/hr    hydrALAZINE (Apresoline) injection 10 mg  10 mg    lactated ringers infusion      levothyroxine (Synthroid) tablet 50 mcg  50 mcg    lactated ringers infusion      ibuprofen (Motrin) tablet 800 mg  800 mg    Followed by    [START ON 2024] ibuprofen (Motrin) tablet 800 mg  800 mg    acetaminophen (Tylenol) tablet 1,000 mg  1,000 mg    Followed by    [START ON 2024] acetaminophen (Tylenol) tablet 1,000 mg  1,000 mg    oxyCODONE immediate-release (Roxicodone) tablet 5 mg  5 mg    oxyCODONE immediate release (Roxicodone) tablet 10 mg  10 mg    ondansetron (Zofran) syringe/vial injection 4 mg  4 mg    Or     ondansetron (Zofran ODT) dispertab 4 mg  4 mg    diphenhydrAMINE (Benadryl) tablet/capsule 25 mg  25 mg    Or    diphenhydrAMINE (Benadryl) injection 25 mg  25 mg    docusate sodium (Colace) capsule 100 mg  100 mg    tetanus-dipth-acell pertussis (Tdap) inj 0.5 mL  0.5 mL    measles, mumps and rubella vaccine (Mmr) injection 0.5 mL  0.5 mL    labetalol (Normodyne/Trandate) injection 10 mg  10 mg    calcium GLUConate injection 1 g  1 g    bisacodyl (Dulcolax) suppository 10 mg  10 mg    prenatal plus vitamin (Stuartnatal 1+1) 27-1 MG tablet 1 Tablet  1 Tablet    simethicone (Mylicon) chewable tablet 125 mg  125 mg    calcium carbonate (Tums) chewable tab 1,000 mg  1,000 mg       Labs:   Recent Labs     24  1114 24  0350 24  0408   WBC 9.2 15.3* 12.0*   RBC 4.85 4.53 4.43   HEMOGLOBIN 14.5 13.8 13.5   HEMATOCRIT 42.4 38.2 38.5   MCV 87.4 84.3 86.9   MCH 29.9 30.5 30.5   MCHC 34.2 36.1* 35.1   RDW 43.1 41.7 45.0   PLATELETCT 132* 127* 129*   MPV 11.3 10.8 9.9     Elevated BPs overnight, 179/115. Nifedipine 10 IR given with little improvement. Consult with Sherice Hinds, morning med order changed to:  Nifedipine 60mg XL in the am, 30mg XL at night, Labetalol 200mg BID    Assessment:  Chief Admitting Dx:  Pre-eclampsia affecting childbirth [O14.94]  Delivery Type:   for preE with severe features  Tubal Ligation:  no    Plan:  Continue routine post partum care.  Continue to monitor BPs  Anticipate discharge on POD#4    JACINTO Scott.

## 2024-02-09 ENCOUNTER — PHARMACY VISIT (OUTPATIENT)
Dept: PHARMACY | Facility: MEDICAL CENTER | Age: 39
End: 2024-02-09
Payer: COMMERCIAL

## 2024-02-09 VITALS
HEART RATE: 97 BPM | SYSTOLIC BLOOD PRESSURE: 137 MMHG | BODY MASS INDEX: 34.23 KG/M2 | WEIGHT: 186 LBS | RESPIRATION RATE: 18 BRPM | HEIGHT: 62 IN | TEMPERATURE: 97.3 F | OXYGEN SATURATION: 96 % | DIASTOLIC BLOOD PRESSURE: 86 MMHG

## 2024-02-09 PROCEDURE — A9270 NON-COVERED ITEM OR SERVICE: HCPCS | Performed by: OBSTETRICS & GYNECOLOGY

## 2024-02-09 PROCEDURE — 700102 HCHG RX REV CODE 250 W/ 637 OVERRIDE(OP): Performed by: OBSTETRICS & GYNECOLOGY

## 2024-02-09 PROCEDURE — A9270 NON-COVERED ITEM OR SERVICE: HCPCS | Performed by: STUDENT IN AN ORGANIZED HEALTH CARE EDUCATION/TRAINING PROGRAM

## 2024-02-09 PROCEDURE — RXMED WILLOW AMBULATORY MEDICATION CHARGE

## 2024-02-09 PROCEDURE — 700102 HCHG RX REV CODE 250 W/ 637 OVERRIDE(OP): Performed by: STUDENT IN AN ORGANIZED HEALTH CARE EDUCATION/TRAINING PROGRAM

## 2024-02-09 PROCEDURE — A9270 NON-COVERED ITEM OR SERVICE: HCPCS

## 2024-02-09 PROCEDURE — 700102 HCHG RX REV CODE 250 W/ 637 OVERRIDE(OP)

## 2024-02-09 RX ORDER — PSEUDOEPHEDRINE HCL 30 MG
100 TABLET ORAL 2 TIMES DAILY PRN
Qty: 60 CAPSULE | Refills: 0 | Status: SHIPPED | OUTPATIENT
Start: 2024-02-09

## 2024-02-09 RX ORDER — LABETALOL 200 MG/1
400 TABLET, FILM COATED ORAL 2 TIMES DAILY
Qty: 120 TABLET | Refills: 0 | Status: SHIPPED | OUTPATIENT
Start: 2024-02-09 | End: 2024-03-01 | Stop reason: SDUPTHER

## 2024-02-09 RX ORDER — NIFEDIPINE 30 MG/1
TABLET, EXTENDED RELEASE ORAL
Qty: 30 TABLET | Refills: 0 | Status: SHIPPED | OUTPATIENT
Start: 2024-02-09 | End: 2024-03-01 | Stop reason: SDUPTHER

## 2024-02-09 RX ORDER — OXYCODONE HYDROCHLORIDE 5 MG/1
5 TABLET ORAL EVERY 4 HOURS PRN
Qty: 10 TABLET | Refills: 0 | Status: SHIPPED | OUTPATIENT
Start: 2024-02-09 | End: 2024-02-19

## 2024-02-09 RX ORDER — IBUPROFEN 600 MG/1
600 TABLET ORAL EVERY 6 HOURS PRN
Qty: 60 TABLET | Refills: 0 | Status: SHIPPED | OUTPATIENT
Start: 2024-02-09 | End: 2024-03-01 | Stop reason: SDUPTHER

## 2024-02-09 RX ORDER — ACETAMINOPHEN 500 MG
1000 TABLET ORAL 3 TIMES DAILY PRN
Qty: 60 TABLET | Refills: 0 | Status: SHIPPED | OUTPATIENT
Start: 2024-02-09 | End: 2024-03-14

## 2024-02-09 RX ORDER — NIFEDIPINE 60 MG/1
TABLET, EXTENDED RELEASE ORAL
Qty: 30 TABLET | Refills: 0 | Status: SHIPPED | OUTPATIENT
Start: 2024-02-09 | End: 2024-03-01 | Stop reason: SDUPTHER

## 2024-02-09 RX ADMIN — IBUPROFEN 800 MG: 800 TABLET, FILM COATED ORAL at 13:47

## 2024-02-09 RX ADMIN — IBUPROFEN 800 MG: 800 TABLET, FILM COATED ORAL at 06:10

## 2024-02-09 RX ADMIN — ACETAMINOPHEN 1000 MG: 500 TABLET ORAL at 01:52

## 2024-02-09 RX ADMIN — VITAMIN A, VITAMIN C, VITAMIN D, VITAMIN E, THIAMINE, RIBOFLAVIN, NIACIN, VITAMIN B6, FOLIC ACID, VITAMIN B12, CALCIUM, IRON, ZINC, COPPER 1 TABLET: 4000; 120; 400; 22; 1.84; 3; 20; 10; 1; 12; 200; 27; 25; 2 TABLET ORAL at 07:49

## 2024-02-09 RX ADMIN — LABETALOL HYDROCHLORIDE 400 MG: 300 TABLET, FILM COATED ORAL at 06:10

## 2024-02-09 RX ADMIN — NIFEDIPINE 60 MG: 30 TABLET, FILM COATED, EXTENDED RELEASE ORAL at 06:12

## 2024-02-09 RX ADMIN — ACETAMINOPHEN 1000 MG: 500 TABLET ORAL at 13:47

## 2024-02-09 RX ADMIN — LEVOTHYROXINE SODIUM 50 MCG: 0.05 TABLET ORAL at 06:10

## 2024-02-09 RX ADMIN — ACETAMINOPHEN 1000 MG: 500 TABLET ORAL at 07:49

## 2024-02-09 ASSESSMENT — EDINBURGH POSTNATAL DEPRESSION SCALE (EPDS)
THINGS HAVE BEEN GETTING ON TOP OF ME: NO, I HAVE BEEN COPING AS WELL AS EVER
I HAVE BEEN SO UNHAPPY THAT I HAVE HAD DIFFICULTY SLEEPING: NOT VERY OFTEN
I HAVE LOOKED FORWARD WITH ENJOYMENT TO THINGS: HARDLY AT ALL
THE THOUGHT OF HARMING MYSELF HAS OCCURRED TO ME: NEVER
I HAVE BEEN SO UNHAPPY THAT I HAVE BEEN CRYING: ONLY OCCASIONALLY
I HAVE FELT SCARED OR PANICKY FOR NO GOOD REASON: NO, NOT MUCH
I HAVE FELT SAD OR MISERABLE: NO, NOT AT ALL
I HAVE BLAMED MYSELF UNNECESSARILY WHEN THINGS WENT WRONG: NO, NEVER
I HAVE BEEN ANXIOUS OR WORRIED FOR NO GOOD REASON: NO, NOT AT ALL
I HAVE BEEN ABLE TO LAUGH AND SEE THE FUNNY SIDE OF THINGS: NOT QUITE SO MUCH NOW

## 2024-02-09 ASSESSMENT — PAIN DESCRIPTION - PAIN TYPE
TYPE: SURGICAL PAIN
TYPE: SURGICAL PAIN
TYPE: ACUTE PAIN;SURGICAL PAIN
TYPE: SURGICAL PAIN;ACUTE PAIN

## 2024-02-09 NOTE — CARE PLAN
The patient is Stable - Low risk of patient condition declining or worsening    Shift Goals  Clinical Goals: Pain control, Vitals signs WDL, Lochia WDL  Patient Goals: pain management  Family Goals: visit infant in NICU    Progress made toward(s) clinical / shift goals:    Problem: Knowledge Deficit - Postpartum  Goal: Patient will verbalize and demonstrate understanding of self and infant care  Outcome: Progressing  Note: Patient demonstrates ability to care for self by ambulating around room, staying hydrated, providing personal hygiene, and bathing herself.     Problem: Psychosocial - Postpartum  Goal: Patient will verbalize and demonstrate effective bonding and parenting behavior  Outcome: Progressing  Note: Patient verbalizes wanting to go see infant to help with infant's cares.

## 2024-02-09 NOTE — DISCHARGE SUMMARY
Discharge Summary:     Date of Admission: 2024  Date of Discharge: 24      Admitting diagnosis:    1. Pregnancy @ 34w1d  2. AMA  3. Preeclampsia with severe features      Discharge Diagnosis:   1. Status post  for preeclampsia with severe features on 2024 .  2. AMA    History reviewed. No pertinent past medical history.  OB History    Para Term  AB Living   2 2   2   2   SAB IAB Ectopic Molar Multiple Live Births           0 2      # Outcome Date GA Lbr Jesus Manuel/2nd Weight Sex Delivery Anes PTL Lv   2  24 34w1d  2.322 kg (5 lb 1.9 oz) M CS-LTranv Spinal Y DAKOTA   1  11 36w0d  2.722 kg (6 lb) F Vag-Spont  Y DAKOTA      Birth Comments: PPROM then IOL/augmentation      Complications: PROM (premature rupture of membranes)     Past Surgical History:   Procedure Laterality Date    PRIMARY C SECTION N/A 2024    Procedure:  SECTION, PRIMARY;  Surgeon: Heriberto Giron M.D.;  Location: [facility];  Service: Obstetrics    CYST EXCISION      R.breast ( benign)       Allergies:  Patient has no known allergies.    Patient Active Problem List   Diagnosis    Advanced maternal age in multigravida    History of PTD at 36wk with PROM -     Hypothyroidism affecting pregnancy in first trimester    Abnormal pregnancy US - R ventricle subjectively smaller than normal - MFM shows normal US, no f/u    Encounter for supervision of high risk pregnancy in third trimester, antepartum    Elevated blood pressure affecting pregnancy in third trimester - PIH, P:C labs ordered  [  ]    Pre-eclampsia in third trimester       Hospital Course:   Pt is a 38 y.o. now  who presented for evaluation of elevated BPs. She was found to have preeclampsia with severe features and difficult-to-control blood pressures, requiring urgent delivery via LTCS as she was remote from delivery.    Spinal anesthesia was utilized with good effect on pain. Single male infant  was delivered via LTCS at 34w1d. Apgars 6, 9 at 1 and 5 minutes respectively.  ml.     Postpartum course notable for early ambulation, well managed pain, tolerance of diet, spontaneous voiding, and appropriate feeding of infant. She has remained afebrile. Blood pressures were persistently elevated requiring titration of BP meds. Adequate control obtained with labetalol 400 mg BID and nifedipine SR 60 mg qAM and 30 mg qPM. Pt will follow up in one week for additional management of blood pressures. All maternal questions and concerns addressed        Physical Exam:  Temp:  [36.5 °C (97.7 °F)-37.2 °C (98.9 °F)] 36.6 °C (97.9 °F)  Pulse:  [74-99] 97  Resp:  [16-18] 18  BP: (134-158)/() 134/89  SpO2:  [96 %-98 %] 98 %  Physical Exam    Gen: well and resting  CV: RRR, nl S1 and S2, no murmur  Resp: unlabored respirations, no intercostal retractions or accessory muscle use, clear to auscultation without rales or wheezes  Chest/Breasts: exam deferred  Abd: nontender, normal bowel sounds, soft  Fundus: firm, below umbilicus, and nontender  Incision: not applicable, (vaginal delivery)  Perineum: deferred  Ext: symmetric and no edema, calves nontender    Current Facility-Administered Medications   Medication Dose    NIFEdipine SR (Procadia-XL) tablet 60 mg  60 mg    NIFEdipine SR (Procadia-XL) tablet 30 mg  30 mg    labetalol (Normodyne) tablet 400 mg  400 mg    levothyroxine (Synthroid) tablet 50 mcg  50 mcg    lactated ringers infusion      ibuprofen (Motrin) tablet 800 mg  800 mg    acetaminophen (Tylenol) tablet 1,000 mg  1,000 mg    Followed by    acetaminophen (Tylenol) tablet 1,000 mg  1,000 mg    oxyCODONE immediate-release (Roxicodone) tablet 5 mg  5 mg    oxyCODONE immediate release (Roxicodone) tablet 10 mg  10 mg    ondansetron (Zofran) syringe/vial injection 4 mg  4 mg    Or    ondansetron (Zofran ODT) dispertab 4 mg  4 mg    diphenhydrAMINE (Benadryl) tablet/capsule 25 mg  25 mg    Or     diphenhydrAMINE (Benadryl) injection 25 mg  25 mg    docusate sodium (Colace) capsule 100 mg  100 mg    tetanus-dipth-acell pertussis (Tdap) inj 0.5 mL  0.5 mL    measles, mumps and rubella vaccine (Mmr) injection 0.5 mL  0.5 mL    labetalol (Normodyne/Trandate) injection 10 mg  10 mg    calcium GLUConate injection 1 g  1 g    bisacodyl (Dulcolax) suppository 10 mg  10 mg    prenatal plus vitamin (Stuartnatal 1+1) 27-1 MG tablet 1 Tablet  1 Tablet    simethicone (Mylicon) chewable tablet 125 mg  125 mg    calcium carbonate (Tums) chewable tab 1,000 mg  1,000 mg       Recent Labs     24  0408   WBC 12.0*   RBC 4.43   HEMOGLOBIN 13.5   HEMATOCRIT 38.5   MCV 86.9   MCH 30.5   MCHC 35.1   RDW 45.0   PLATELETCT 129*   MPV 9.9         Activity/ Discharge Instructions::   Discharge to home  Pelvic Rest x 6 weeks  No heavy lifting x4 weeks  Call or come to ED for: heavy vaginal bleeding, fever >100.4, severe abdominal pain, severe headache, chest pain, shortness of breath,  N/V, incisional drainage, or other concerns.    Contraception: abstinence     Follow up:  [facility] in 5 weeks for vaginal delivery; 1 week for incision check for  delivery.     [facility] [address]  [facility] [address]  [phone]  Follow up in 1 week(s)  for incision check, blood pressure check       Future Appointments   Date Time Provider Department Center   2024 12:00 PM Cory Keenan M.D. [facility]   3/14/2024 10:00 AM CED [facility]        Discharge Meds:   Current Outpatient Medications   Medication Sig Dispense Refill    docusate sodium 100 MG Cap Take 100 mg by mouth 2 times a day as needed for Constipation. 60 Capsule 0    acetaminophen (TYLENOL) 500 MG Tab Take 2 Tablets by mouth 3 times a day as needed for Mild Pain or Moderate Pain. 60 Tablet 0    ibuprofen (MOTRIN) 600 MG Tab Take 1 Tablet by mouth every 6 hours as needed for  Mild Pain or Moderate Pain. 60 Tablet 0    labetalol (NORMODYNE) 200 MG Tab Take 2 Tablets by mouth 2 times a day. 120 Tablet 0    NIFEdipine SR (PROCADIA-XL) 30 MG tablet Take 2 Tablets by mouth every morning AND 1 Tablet every evening. Do all this for 30 days. 90 Tablet 0    oxyCODONE immediate-release (ROXICODONE) 5 MG Tab Take 1 Tablet by mouth every four hours as needed for Severe Pain for up to 10 days. 10 Tablet 0       Tommy Jenkins M.D.   PGY-1  [facility]

## 2024-02-09 NOTE — CARE PLAN
The patient is Stable - Low risk of patient condition declining or worsening    Shift Goals  Clinical Goals: clincally stable  Patient Goals: pain management  Family Goals: visit infant in NICU    Progress made toward(s) clinical / shift goals:  Patient has had stable increased blood pressures, but stable enough for discharge.    Patient is not progressing towards the following goals:

## 2024-02-09 NOTE — DISCHARGE INSTRUCTIONS
Pelvic rest x6 weeks  Return for follow up visit in 1 weeks.  Call or come to ED for: heavy vaginal bleeding, fever >100.4, severe abdominal pain, severe headache, chest pain, shortness of breath,  N/V, incisional drainage, or other concerns.   DISCHARGE INSTRUCTIONS (Azeri) POSTPARTUM FOR MOM      NIK LAS CATHIE:  Antes de tocar el bebé.  Antes del amamantamiento o darle al bebé lactancia artificial.  Después de usar el baño.    CUIDADO DE LAS HERIDAS:  La Incisión de la Operación Cesárea:  Mantenga limpea y seca, NO levante nada que pesa más que [place] bebé por 6 semenas.  No debe ninguna apertura ni pus.  Episiotomía/Moses Vaginal:  Use un spray de Tucks o Dermoplast, tome un baño de asiento cuando necesite (6 pulgadas), siga usando la botella Ambar hasta que pare de sangrar.    CUIDADA DEL SENO:  Lleve un sostén.  Si esta` amamantando no use jabón en el seno ni en los pezones.  Aplique lanolina si los pezones se ponen quebrazados y si le duelen.    CUIDADO DE LA VAGINA:  [place] puede entrar la vagina por 6 semanas:  Actividad sexual, Ducha vaginal, Tampones.  El sangramiento puede seguir por 2 a 4 semanas.  La cantidad es variable.  Llame a [place] med`ico(a) obstetra si hay mucha inga (si usa ma`s de michael toalla femenina cada hora).    CONTRACEPCIÒN:  Es posible embarazarse en cualquier tiempo despus del parto y mientras el amamantamiento.  Debe planear de discutir los metodos de cantracepción con [place] médico(a) cuando vuelva en 6 semanas para [place] revisión médica.    DIETA Y DEFECACÒN:  Para evitar la constipación coma mas fibra (cereal salvado, fruta, y verduras) Y tome muchos liquidos.   Es común orinar frecuentemente después del parto.    POSTPARTO Y LA DEPRESÒN:  Sriram los primeros moore después del parto es común sentirse:  Impaciente, irritable, o llorar  Estos sentimientos llegan y van rapidamente.  No obstante, arian michael de cada diony mujeres tiene síntomas emocionales conocidos mp depresión  postparto.  Despresión Postparto:  Puede empezar tan pronto mp el apurva o tercer día después del parto o puede durar algunas semanas o meses para desarrollar.  Síntomas de la depresión pueden presentarse, rachael son más intensos:  Pérdida del hambre  Frecuencia de llorar  Sentirse desesperada o perder el control  Demasiada o no suficiente preocupación con [place] bebé  Tener miedo de tocar el bebé  No preocuparse con [place] propia apariencia  Incapacidad de dormir o dormir excesivamente    DEPRESIÒN / RIESGO AL SUICIDIO:    Cuando se le da de isabela de alguna entidad de [facility], es importante aprender a mantener a norma de hacerse daño a sí mismo.    Reconocer los signos de advertencia:  Cambios bruscos en la peronalidad, positiva o negativa, incluyendo aumento de la energia  Regalar posesiones  Cambios en patrones de comer - significativos cambios de peso - positivos o negativos  Cambio de patrones para dormir - no poder dormir o dormir todo el tiempo  Falta de voluntad o incapacidad de comunicarse  Depresión  Christina, desánimo y tristeza inusual  Habla de querer morir  Descuido del aspecto personal  Rebeldía - comportamiento imprudente  Retiro de personas y actividades que les gusta  Confusión-incapacidad para concentrarse    Si usted o un ser querido observa cualquiera de estos comportamientos o tiene preocupaciones de hacerse daño a si mismo, aquí le damos lo que usted puede hacer:  Hablar de ti - tus sentimientos y razones para hacerse chelsey a si mismo  Retire cualquier medio que se podría utilizar para lastimarse (ejemplos: píldoras, cuerdas, cordones de extensión, arma de rojas)  Obtenga ayuda profesional de la comunidad ([place] mental, abuso de sustancias, orientación psicológica)  No estar solo: llamar a un contacto seguro - michael persona que confía en que estará allí por usted  Llamada local L´INEA de CRISIS [phone] y [phone]  Llamada local [employer] [facility] Para Crisis de [facility]  [phone] or [email or web address]  [place] dinesha nacional, líneas de prevención del suicidio  Preventión del Suicidio Nacional Lifeline [phone]-FGWK ([phone])  Línea Nacional de la [place] de Red 800-SUICIDE ([phone])  PATIENT DISCHARGE EDUCATION INSTRUCTION SHEET    REASONS TO CALL YOUR OBSTETRICIAN  Persistent fever, shaking, chills (Temperature higher than 100.4) may indicate you have an infection  Heavy bleeding: soaking more than 1 pad per hour; Passing clots an egg-sized clot or bigger may mean you have an postpartum hemorrhage  Foul odor from vagina or bad smelling or discolored discharge or blood  Breast infection (Mastitis symptoms); breast pain, chills, fever, redness or red streaks, may feel flu like symptoms  Urinary pain, burning or frequency  Incision that is not healing, increased redness, swelling, tenderness or pain, or any pus from episiotomy or  site may mean you have an infection  Redness, swelling, warmth, or painful to touch in the calf area of your leg may mean you have a blood clot  Severe or intensified depression, thoughts or feelings of wanting to hurt yourself or someone else   Pain in chest, obstructed breathing or shortness of breath (trouble catching your breath) may mean you are having a postpartum complication. Call your provider immediately   Headache that does not get better, even after taking medicine, a bad headache with vision changes or pain in the upper right area of your belly may mean you have high blood pressure or post birth preeclampsia. Call your provider immediately    HAND WASHING  All family and friends should wash their hands:  Before and after holding the baby  Before feeding the baby  After using the restroom or changing the baby's diaper    WOUND CARE  Ask your physician for additional care instructions. In general:   Incision:  May shower and pat incision dry   Keep the incision clean and dry  There should not be any opening or pus from  the incision  Continue to walk at home 3 times a day   Do NOT lift anything heavier than your baby (over 10 pounds)  Encourage family to help participate in care of the  to allow rest and mom time to heal  Episiotomy/Laceration  May use oscar-spray bottle, witch hazel pads and dermaplast spray for comfort  Use osacr-spray bottle after urinating to cleanse perineal area  To prevent burning during urination spray oscar-water bottle on labial area   Pat perineal area dry until episiotomy/laceration is healed  Continue to use oscar-bottle until bleeding stops as needed  If have a 2nd degree laceration or greater, a Sitz bath can offer relief from soreness, burning, and inflammation   Sitz Bath   Sit in 6 inches of warm water and soak laceration as needed until the laceration heals    VAGINAL CARE AND BLEEDING  Nothing inside vagina for 6 weeks:   No sexual intercourse, tampons or douching  Bleeding may continue for 2-4 weeks. Amount and color may vary  Soaking 1 pad or more in an hour for several hours is considered heavy bleeding  Passing large egg sized blood clots can be concerning  If you feel like you have heavy bleeding or are having increasing amount of blood clots call your Obstetrician immediately  If you begin feeling faint upon standing, feeling sick to your stomach, have clammy skin, a really fast heartbeat, have chills, start feeling confused, dizzy, sleepy or weak, or feeling like you're going to faint call your Obstetrician immediately    HYPERTENSION   Preeclampsia or gestational hypertension are types of high blood pressure that only pregnant women can get. It is important for you to be aware of symptoms to seek early intervention and treatment. If you have any of these symptoms immediately call your Obstetrician    Vision changes or blurred vision   Severe headache or pain that is unrelieved with medication and will not go away  Persistent pain in upper abdomen or shoulder   Increased swelling of  "face, feet, or hands  Difficulty breathing or shortness of breath at rest  Urinating less than usual    URINATION AND BOWEL MOVEMENTS  Eating more fiber (bran cereal, fruits, and vegetables) and drinking plenty of fluids will help to avoid constipation  Urinary frequency and urgency after childbirth is normal  If you experience any urinary pain, burning or frequency call your provider    BIRTH CONTROL  It is possible to become pregnant at any time after delivery and while breastfeeding  Plan to discuss a method of birth control with your physician at your post delivery follow up visit    POSTPARTUM BLUES  During the first few days after birth, you may experience a sense of the \"blues\" which may include impatience, irritability or even crying. These feelings come and go quickly. However, as many as 1 in 10 women experience emotional symptoms known as postpartum depression.     POSTPARTUM DEPRESSION    May start as early as the second or third day after delivery or take several weeks or months to develop. Symptoms of \"blues\" are present, but are more intense: Crying spells; loss of appetite; feelings of hopelessness or loss of control; fear of touching the baby; over concern or no concern at all about the baby; little or no concern about your own appearance/caring for yourself; and/or inability to sleep or excessive sleeping. Contact your Obstetrician if you are experiencing any of these symptoms     PREVENTING SHAKEN BABY  If you are angry or stressed, PUT THE BABY IN THE CRIB, step away, take some deep breaths, and wait until you are calm to care for the baby. DO NOT SHAKE THE BABY. You are not alone, call a supporter for help.  Crisis Call Center 24/7 crisis call line (323-262-7294) or (1-832.646.5226)  You can also text them, text \"ANSWER\" (481836)     ([employer] [facility])          "

## 2024-02-09 NOTE — LACTATION NOTE
Followup visit  MOB reports she is pumping and denies pain with pump. Reviewed expected pump yields of first 5 days, and encouraged to pump regularly to ensure a plentiful milk supply. Given Armenian hand out on maximizing milk supply. Given Birth and Beyond chaparro info.   Encouraged to set an alarm in her phone and pump Q 3 hours.

## 2024-02-09 NOTE — PROGRESS NOTES
Assessment completed. Fundus firm, lochia light. Abd. incisions with telfa dressing intact. Plan of care reviewed in Macedonian. Denies pain at this time, will call if pain med intervention needed. Call light within reach, bed at lowest position, and shows no signs of distress at this time.

## 2024-02-09 NOTE — CARE PLAN
Problem: Bowel Elimination - Post Surgical  Goal: Patient will resume regular bowel sounds and function with no discomfort or distention  Outcome: Progressing   Patient has bowel movement today     Problem: Early Mobilization - Post Surgery  Goal: Early mobilization post surgery  Outcome: Progressing  Patient is standing and ambulating as tolerated      The patient is Stable - Low risk of patient condition declining or worsening    Shift Goals  Clinical Goals: Maintain acceptable pain level  Patient Goals: pain management  Family Goals: visit infant in NICU

## 2024-02-09 NOTE — DISCHARGE PLANNING
Meds-to-Beds: Discharge prescription orders listed below delivered to patient's bedside. ADELAIDA Tatum notified. Patient counseled in Portuguese.     Current Outpatient Medications   Medication Sig Dispense Refill    docusate sodium 100 MG Cap Take 1 capsule by mouth 2 times a day as needed for Constipation. 60 Capsule 0    acetaminophen (TYLENOL) 500 MG Tab Take 2 Tablets by mouth 3 times a day as needed for Mild Pain or Moderate Pain. 60 Tablet 0    ibuprofen (MOTRIN) 600 MG Tab Take 1 Tablet by mouth every 6 hours as needed for Mild Pain or Moderate Pain. 60 Tablet 0    labetalol (NORMODYNE) 200 MG Tab Take 2 Tablets by mouth 2 times a day. 120 Tablet 0    NIFEdipine SR (PROCADIA-XL) 30 MG tablet Take 1 Tablet by mouth every evening. Take in addition to the 60 mg tablet in the morning for a total daily dose of 90 mg 30 Tablet 0    oxyCODONE immediate-release (ROXICODONE) 5 MG Tab Take 1 Tablet by mouth every four hours as needed for Severe Pain for up to 10 days. 10 Tablet 0    NIFEdipine SR (PROCADIA-XL) 60 MG CR tablet Take 1 tablet by mouth every morning. Take in addition to the 30 mg tablet in the evening for a total daily dose of 90 mg 30 Tablet 0      Mami Bangura, CaityD

## 2024-02-09 NOTE — PROGRESS NOTES
Called and messessed Dr. Jenkins about patient having discharge medications and a discharge order, but no discharge order. Will wait for a return phone call.

## 2024-02-13 ENCOUNTER — GYNECOLOGY VISIT (OUTPATIENT)
Dept: OBGYN | Facility: CLINIC | Age: 39
End: 2024-02-13
Payer: MEDICAID

## 2024-02-13 VITALS — WEIGHT: 168 LBS | DIASTOLIC BLOOD PRESSURE: 90 MMHG | SYSTOLIC BLOOD PRESSURE: 140 MMHG | BODY MASS INDEX: 30.73 KG/M2

## 2024-02-13 DIAGNOSIS — Z98.891 S/P C-SECTION: ICD-10-CM

## 2024-02-13 DIAGNOSIS — Z09 POSTOP CHECK: ICD-10-CM

## 2024-02-13 DIAGNOSIS — Z87.59 H/O PRE-ECLAMPSIA: ICD-10-CM

## 2024-02-13 PROCEDURE — 3077F SYST BP >= 140 MM HG: CPT | Performed by: OBSTETRICS & GYNECOLOGY

## 2024-02-13 PROCEDURE — 3080F DIAST BP >= 90 MM HG: CPT | Performed by: OBSTETRICS & GYNECOLOGY

## 2024-02-13 PROCEDURE — 99024 POSTOP FOLLOW-UP VISIT: CPT | Performed by: OBSTETRICS & GYNECOLOGY

## 2024-02-13 ASSESSMENT — FIBROSIS 4 INDEX: FIB4 SCORE: 2.05

## 2024-02-13 NOTE — PROGRESS NOTES
CC: Post-operative check    Date of Surgery: 2024    HPI: Patient is a 38 y.o. year old  who presents for follow up after a primary  section for pre-eclampsia with severe features and inability to control blood pressures.  used for encounter. She is doing well today. She is taking her blood pressure medications. First blood pressure was 150/82 followed by a repeat of 140/90. She did receive 24 hours of postpartum. She was counseled to continue her blood pressure medications. She has not other severe features like headache, vision changes, chest pain, or SOB.     ROS:   General: Fever: no  GI: Abdominal pain: no  : Vaginal bleeding: minimal     PFSH:  I personally reviewed the past medical and surgical histories.     PHYSICAL EXAMINATION:  Vital Signs:   Vitals:    24 1150   BP: (!) 150/82   Weight: 168 lb     Appearance/Psychiatric: in no apparent distress and well developed and well nourished  Heart: She does not have edema.  Lungs:Respiratory effort is normal.  Abd: soft, nontender, no rebound or guarding, well healing pfannenstiel incision with dermabond   Pelvic: deferred    ASSESSMENT AND PLAN:  38 y.o.    Dulce was seen today for other.    Diagnoses and all orders for this visit:    Postop check    S/P     H/O pre-eclampsia    Plan:  Overall normal post-op course  She was counseled to continue to take her prescribed blood pressure medication until the 6 week visit  We reviewed the post op precautions, return precautions, and the importance of follow up for the 6 week visit     Cory Keenan M.D.  2024

## 2024-02-23 ENCOUNTER — TELEPHONE (OUTPATIENT)
Dept: OBGYN | Facility: CLINIC | Age: 39
End: 2024-02-23
Payer: MEDICAID

## 2024-02-23 NOTE — TELEPHONE ENCOUNTER
PT wanted to know if she should continue BP meds even if her BP is good. I informed patient she should until advised otherwise by a provider.

## 2024-02-23 NOTE — TELEPHONE ENCOUNTER
PT called in again with same question. PT informed she should continue medication until she is seen by a provider and they can inform her wether or not she should continue . PT verbalized understanding.

## 2024-03-01 ENCOUNTER — GYNECOLOGY VISIT (OUTPATIENT)
Dept: OBGYN | Facility: CLINIC | Age: 39
End: 2024-03-01
Payer: MEDICAID

## 2024-03-01 VITALS — SYSTOLIC BLOOD PRESSURE: 98 MMHG | WEIGHT: 162 LBS | BODY MASS INDEX: 29.63 KG/M2 | DIASTOLIC BLOOD PRESSURE: 76 MMHG

## 2024-03-01 DIAGNOSIS — O14.93 PRE-ECLAMPSIA IN THIRD TRIMESTER: ICD-10-CM

## 2024-03-01 PROCEDURE — 3074F SYST BP LT 130 MM HG: CPT | Performed by: OBSTETRICS & GYNECOLOGY

## 2024-03-01 PROCEDURE — 3078F DIAST BP <80 MM HG: CPT | Performed by: OBSTETRICS & GYNECOLOGY

## 2024-03-01 PROCEDURE — 99024 POSTOP FOLLOW-UP VISIT: CPT | Performed by: OBSTETRICS & GYNECOLOGY

## 2024-03-01 RX ORDER — NIFEDIPINE 30 MG/1
TABLET, EXTENDED RELEASE ORAL
Qty: 30 TABLET | Refills: 0 | Status: SHIPPED | OUTPATIENT
Start: 2024-03-01 | End: 2024-03-08 | Stop reason: SDUPTHER

## 2024-03-01 RX ORDER — LABETALOL 200 MG/1
200 TABLET, FILM COATED ORAL 2 TIMES DAILY
Qty: 60 TABLET | Refills: 0 | Status: SHIPPED | OUTPATIENT
Start: 2024-03-01 | End: 2024-03-14

## 2024-03-01 RX ORDER — IBUPROFEN 600 MG/1
600 TABLET ORAL EVERY 6 HOURS PRN
Qty: 60 TABLET | Refills: 1 | Status: SHIPPED | OUTPATIENT
Start: 2024-03-01

## 2024-03-01 RX ORDER — NIFEDIPINE 60 MG/1
TABLET, EXTENDED RELEASE ORAL
Qty: 30 TABLET | Refills: 0 | Status: SHIPPED | OUTPATIENT
Start: 2024-03-01

## 2024-03-01 ASSESSMENT — FIBROSIS 4 INDEX: FIB4 SCORE: 2.05

## 2024-03-01 NOTE — PROGRESS NOTES
Dulce Lutz is a 38 y.o. female who presents for her Postpartum Exam      HPI Comments: Pt presents for postpartum exam s/p  for preeclampsia with severe features on . Patient is without complaints.  Patient presents today for blood pressure check  Desires Nexplanon for contraception.    Review of Systems:   Pertinent positives documented in HPI and all other systems reviewed & are negative    Last pap:     Physical exam:   Vital measurements:  BP 98/76 (BP Location: Right arm, Patient Position: Sitting, BP Cuff Size: Adult)   Wt 162 lb   Body mass index is 29.63 kg/m². (Goal BMI>18 <25)  Nursing note and vitals reviewed.  Gen: She is oriented to person, place, and time. She appears well-developed and well-nourished. No distress.   Abdomen: soft, nontender, nondistended, no rebound/guarding.  Incision healing well  Extremities: nontender, no clubbing, cyanosis or edema  Pelvic: Deferred      A/P: Postpartum status post  for preeclampsia with severe features    Blood pressure was 98/76 today.  She continues to be on nifedipine 60 mg p.o. every morning, 30 mg p.o. nightly.  Labetalol 400 mg p.o. twice daily throughout the day.    Will decrease labetalol to 200 mg p.o. twice daily at this time.  Patient will continue to monitor her pressures at home and if her pressures continue to remain low, she will stop the labetalol completely.  Continue on nifedipine as scheduled.    Recommend patient make appointment for Nexplanon insertion    Follow up in 1 week

## 2024-03-01 NOTE — LETTER
March 1, 2024       Patient: Dulce Lutz   YOB: 1985   Date of Visit: 3/1/2024         To Whom It May Concern:    Dulce Lutz underwent surgery on 02/05/2024 and is currently still recovering. She will continue to recover until 03/18/2024.     If you have any questions or concerns, please don't hesitate to call [phone]          Sincerely,          Heriberto Giron M.D.  Electronically Signed

## 2024-03-01 NOTE — PROGRESS NOTES
Pt here for a BP check and medication management.   Delivered : 2/5/24 @34w1d  BP : 98/76  Currently taking Labetolol BID 400mg per dose.   Pt states overall she is feeling well and has not noticed any issues.

## 2024-03-08 ENCOUNTER — GYNECOLOGY VISIT (OUTPATIENT)
Dept: OBGYN | Facility: CLINIC | Age: 39
End: 2024-03-08
Payer: MEDICAID

## 2024-03-08 VITALS — DIASTOLIC BLOOD PRESSURE: 70 MMHG | BODY MASS INDEX: 29.52 KG/M2 | WEIGHT: 161.4 LBS | SYSTOLIC BLOOD PRESSURE: 118 MMHG

## 2024-03-08 DIAGNOSIS — I10 HYPERTENSION, UNSPECIFIED TYPE: ICD-10-CM

## 2024-03-08 PROCEDURE — 99213 OFFICE O/P EST LOW 20 MIN: CPT | Performed by: OBSTETRICS & GYNECOLOGY

## 2024-03-08 PROCEDURE — 3074F SYST BP LT 130 MM HG: CPT | Performed by: OBSTETRICS & GYNECOLOGY

## 2024-03-08 PROCEDURE — 3078F DIAST BP <80 MM HG: CPT | Performed by: OBSTETRICS & GYNECOLOGY

## 2024-03-08 RX ORDER — NIFEDIPINE 30 MG/1
TABLET, EXTENDED RELEASE ORAL
Qty: 30 TABLET | Refills: 0 | Status: SHIPPED | OUTPATIENT
Start: 2024-03-08 | End: 2024-04-07

## 2024-03-08 ASSESSMENT — ENCOUNTER SYMPTOMS
CONSTITUTIONAL NEGATIVE: 1
GASTROINTESTINAL NEGATIVE: 1
CARDIOVASCULAR NEGATIVE: 1
NEUROLOGICAL NEGATIVE: 1
PSYCHIATRIC NEGATIVE: 1
EYES NEGATIVE: 1
MUSCULOSKELETAL NEGATIVE: 1
RESPIRATORY NEGATIVE: 1

## 2024-03-08 ASSESSMENT — FIBROSIS 4 INDEX: FIB4 SCORE: 2.05

## 2024-03-08 NOTE — PROGRESS NOTES
GYN PROBLEM VISIT    CC:  No chief complaint on file.       HPI: Patient is a 38 y.o.  who presents for blood pressure monitoring.  used for encounter. She is currently taking 200mg of labetalol BID and 60mg nifedipine XL in the am and 30mg nifedipine XL in the evening. Blood pressures at home are mostly in the 130s/80s. Today in clinic her blood pressure is 118/70. We discussed the possibility that her home cuff may need to be calibrated. She denies light headedness or dizziness. She did have a recent headache that she attributed to sleep deprivation and she had some back pain after she stopped expressing milk. She denies fever or chills. Today I recommended that she discontinue the labetalol and continue on the nifedipine only. She has been very compliant with keeping a blood pressure log and will contact us if her blood pressure is too elevated without the labetalol.       ROS:   Review of Systems   Constitutional: Negative.    HENT: Negative.     Eyes: Negative.    Respiratory: Negative.     Cardiovascular: Negative.    Gastrointestinal: Negative.    Genitourinary: Negative.    Musculoskeletal: Negative.    Skin: Negative.    Neurological: Negative.    Endo/Heme/Allergies: Negative.    Psychiatric/Behavioral: Negative.           PFSH:  I personally reviewed the past medical and surgical histories.     Social History     Tobacco Use    Smoking status: Never    Smokeless tobacco: Never   Vaping Use    Vaping Use: Never used   Substance Use Topics    Alcohol use: Never    Drug use: Never       Social History     Substance and Sexual Activity   Sexual Activity Not Currently    Partners: Male        ALLERGIES / REACTIONS:  No Known Allergies                        PHYSICAL EXAMINATION:  Vital Signs:   Vitals:    24 1133   BP: 118/70   BP Location: Left arm   Patient Position: Sitting   BP Cuff Size: Adult   Weight: 161 lb 6.4 oz     Body mass index is 29.52 kg/m².    Physical  Exam  Vitals reviewed.   Constitutional:       Appearance: Normal appearance.   HENT:      Head: Normocephalic.   Eyes:      Extraocular Movements: Extraocular movements intact.      Pupils: Pupils are equal, round, and reactive to light.   Cardiovascular:      Rate and Rhythm: Normal rate.   Pulmonary:      Effort: Pulmonary effort is normal.   Abdominal:      General: Abdomen is flat.      Palpations: Abdomen is soft.   Musculoskeletal:         General: Normal range of motion.      Cervical back: Normal range of motion.   Skin:     General: Skin is warm and dry.   Neurological:      General: No focal deficit present.      Mental Status: She is alert and oriented to person, place, and time.   Psychiatric:         Mood and Affect: Mood normal.         Behavior: Behavior normal.          ASSESSMENT AND PLAN:  38 y.o.  who presents for a blood pressure check after a  on 2024 for preeclampsia with severe features    1. Hypertension, unspecified type  - NIFEdipine SR (PROCADIA-XL) 30 MG tablet; Take 1 Tablet by mouth every evening. Take in addition to the 60 mg tablet in the morning for a total daily dose of 90 mg  Dispense: 30 Tablet; Refill: 0    Plan:  She was counseled to discontinue the labetalol at this time due to blood pressure of 118/70 and overall normal blood pressures at home  She is already scheduled for next week for a postpartum visit. Her blood pressure will be check at that appointment and she possibly decrease her blood pressure medication further.   She is interested in nexplanon for contraception      Cory Keenan M.D.  Obstetrics & Gynecology   [phone]  12:48 PM

## 2024-03-14 ENCOUNTER — POST PARTUM (OUTPATIENT)
Dept: OBGYN | Facility: CLINIC | Age: 39
End: 2024-03-14
Payer: MEDICAID

## 2024-03-14 VITALS — DIASTOLIC BLOOD PRESSURE: 90 MMHG | BODY MASS INDEX: 29.81 KG/M2 | SYSTOLIC BLOOD PRESSURE: 124 MMHG | WEIGHT: 163 LBS

## 2024-03-14 DIAGNOSIS — Z87.59 HISTORY OF PRE-ECLAMPSIA: ICD-10-CM

## 2024-03-14 DIAGNOSIS — Z30.017 ENCOUNTER FOR INITIAL PRESCRIPTION OF NEXPLANON: ICD-10-CM

## 2024-03-14 PROBLEM — E03.8 OTHER SPECIFIED HYPOTHYROIDISM: Status: ACTIVE | Noted: 2024-03-14

## 2024-03-14 PROCEDURE — 11981 INSERTION DRUG DLVR IMPLANT: CPT | Performed by: NURSE PRACTITIONER

## 2024-03-14 PROCEDURE — 0503F POSTPARTUM CARE VISIT: CPT | Performed by: NURSE PRACTITIONER

## 2024-03-14 ASSESSMENT — ENCOUNTER SYMPTOMS
EYES NEGATIVE: 1
CONSTITUTIONAL NEGATIVE: 1
MUSCULOSKELETAL NEGATIVE: 1
NEUROLOGICAL NEGATIVE: 1
PSYCHIATRIC NEGATIVE: 1
RESPIRATORY NEGATIVE: 1
GASTROINTESTINAL NEGATIVE: 1
CARDIOVASCULAR NEGATIVE: 1

## 2024-03-14 ASSESSMENT — EDINBURGH POSTNATAL DEPRESSION SCALE (EPDS)
I HAVE BEEN ANXIOUS OR WORRIED FOR NO GOOD REASON: NO, NOT AT ALL
I HAVE FELT SAD OR MISERABLE: NO, NOT AT ALL
I HAVE BLAMED MYSELF UNNECESSARILY WHEN THINGS WENT WRONG: YES, SOME OF THE TIME
I HAVE FELT SCARED OR PANICKY FOR NO GOOD REASON: NO, NOT MUCH
I HAVE BEEN SO UNHAPPY THAT I HAVE HAD DIFFICULTY SLEEPING: NOT AT ALL
I HAVE LOOKED FORWARD WITH ENJOYMENT TO THINGS: AS MUCH AS I EVER DID
TOTAL SCORE: 4
I HAVE BEEN SO UNHAPPY THAT I HAVE BEEN CRYING: NO, NEVER
I HAVE BEEN ABLE TO LAUGH AND SEE THE FUNNY SIDE OF THINGS: AS MUCH AS I ALWAYS COULD
THE THOUGHT OF HARMING MYSELF HAS OCCURRED TO ME: NEVER
THINGS HAVE BEEN GETTING ON TOP OF ME: NO, MOST OF THE TIME I HAVE COPED QUITE WELL

## 2024-03-14 ASSESSMENT — FIBROSIS 4 INDEX: FIB4 SCORE: 2.05

## 2024-03-14 NOTE — PROGRESS NOTES
Subjective     Dulce Lutz is a 38 y.o. female who presents with No chief complaint on file.            S   37 y/o now  s/p c/s on 24 of baby without complications. No laceration. Now 5 weeks postpartum. Prenatal course significant for AMA, Pre E with severe features, hypothyroidism. Postpartum course without any complications. Feeling well and happy with baby, denies any severe mood swings or s/sx of postpartum depression and anxiety.     Baby is doing well, formula exclusively.  Has not resumed sexual activity.  Mother reports no issues with bowel or bladder routine, continued regular diet. Bleeding since birth has subsided at this time to irregular spotting, no return to menses. Denies incisional pain, drainage or redness. No vaginal pain/odor/itching, fever, headaches, dizziness/SOB or dysuria. Desires nexplanon for contraception.     O  See PE: Physical exam today with no abnormal findings  Vital signs WNL: BP and weight as notated  PAP: NILM on     A  Reassuring exam of postpartum woman s/p c/s on 24     P  - Nexplanon inserted today for contraception  - F/u with PCP at [facility] for BP control, will continue 60mg am and 30mg pm Nifedipine dose until then as BP log shows good control  - Hgba1c and thyroid labs ordered  - Partner given info about vasectomy program as well   - Resumption of sexual activity: safe sex precautions given  - Counseling on nutrition, adequate hydration, and exercise   - Kegel Exercises for pelvic floor/prevention of urinary incontinence   - EPDS: 4  - Continue PNV for breastfeeding mother  - Counseling on PAP guidelines re: next PAP due   - Warning s/sx of postpartum infection, depression, preeclampsia   - RTC in 6 weeks      Today I discussed with the patient subdermal implant/Nexplanon.   Pt reports no unprotected sex in past two weeks. Urine pregnancy test negative today.   We discussed its mechanism of action and how it prevents pregnancy  Discussed  that the implant is a progesterone only form of contraception.  Also discussed with patient risks associated with placement of the device which can include infection, bruising and pain. We also discussed the possibility of the device can be displaced. We discussed that the device would be palpable under the skin of the arm. Pt should check that the implant is where it should be each month at least.   I also discussed with the patient side effects of the device which can include but are not limited to, irregular bleeding which can include amenorrhea, irregular spotting and also worsening of menstrual cycles. There is an increased risk of headaches related to the device and potential for weight gain.  After thorough discussion the patient had opportunity to ask questions regarding the device and at this time desires the device to be placed.     Patient information handout is given for the device.      Return to Clinic for:  Pain, redness or bleeding at the insertion site   Any purulent drainage (puss)  Heavy bleeding (saturating a pad every 1-2 hours) that lasts more than 1-2 days    Reviewed ability to control prolonged bleeding with other pharmacological means    Follow up in 4 - 6 weeks for post insertion check                Review of Systems   Constitutional: Negative.    HENT: Negative.     Eyes: Negative.    Respiratory: Negative.     Cardiovascular: Negative.    Gastrointestinal: Negative.    Genitourinary: Negative.    Musculoskeletal: Negative.    Skin: Negative.    Neurological: Negative.    Endo/Heme/Allergies: Negative.    Psychiatric/Behavioral: Negative.                Objective     BP (!) 124/90   Wt 163 lb   LMP 05/22/2023 (Approximate)   BMI 29.81 kg/m²      Physical Exam  Constitutional:       Appearance: Normal appearance.   HENT:      Head: Normocephalic.      Nose: Nose normal.   Cardiovascular:      Rate and Rhythm: Normal rate and regular rhythm.      Pulses: Normal pulses.   Pulmonary:       Effort: Pulmonary effort is normal.   Abdominal:      General: Abdomen is flat.      Comments: Incision well healed and approximated   Musculoskeletal:         General: Normal range of motion.      Cervical back: Normal range of motion.   Skin:     General: Skin is warm.   Neurological:      Mental Status: She is alert and oriented to person, place, and time.   Psychiatric:         Mood and Affect: Mood normal.         Behavior: Behavior normal.         Thought Content: Thought content normal.         Judgment: Judgment normal.                             Assessment & Plan        1. History of pre-eclampsia      2. Postpartum care following  delivery    - TSH; Future  - T3 FREE; Future  - FREE THYROXINE; Future  - HEMOGLOBIN A1C; Future

## 2024-03-14 NOTE — PROGRESS NOTES
Pt is here today for postpartum visit.   Delivery type : 2/5/24 @34w1d via C/S  Currently formula feeding . For the first few weeks she was doing both now x1wk just formula.   LMP : PP bleeding - no cycle   BCM : Nexplanon - Cesar like to discuss   Last pap : 8/2023 - WNL   EPDS : 4  Pt states she has no concerns.       Nexplanon -   LOT : [id number]  NDC : 58004-659-06  EXP : 11/2025

## 2024-03-14 NOTE — PROCEDURES
Procedure note: Nexplanon insertion:     Informed consent obtained, consent signed-discussed the risks of Nexplanon; pain, bleeding, infection, bruising, possible pregnancy, difficulty with removing implant, possible scarring to arm.   All the risks and benefits of the procedure and the device are explained and patient verbalizes understanding and signs the consent     Urine hCG negative    Patient positioned supine with nondominant arm exposed.    Medial epicondyle of left arm palpated    Surgical chao placed 8-10 cm medial to the medial epicondyle    Betadine prep the skin    Local anesthesia-1% lidocaine injected using a 1 1/2 inch 27 gauge needle     Implant inserted via the Nexplanon insertion device subdermally in a sterile fashion    The patient and provider can feel the implant under the skin and the blue end of the insertion device is present indicating implant insertion    Steristrip and sterile 4x4's     Pressure bandage placed    Patient instructed to remove dressing  in 24 hours    Patient instructed to use a band-aid for 2 days there after    Patient tolerated the procedure well    Followup in 4-6  weeks for post insertion checkup    LOT : [id number]  EXP : 11/2025

## 2024-03-19 ENCOUNTER — TELEPHONE (OUTPATIENT)
Dept: OBGYN | Facility: CLINIC | Age: 39
End: 2024-03-19
Payer: MEDICAID

## 2024-03-19 NOTE — TELEPHONE ENCOUNTER
Pt called asking if she has to drink the glucose liquid for the A1C informed patient the A1C is a blood draw. Pt understood

## 2024-03-29 DIAGNOSIS — I10 HYPERTENSION, UNSPECIFIED TYPE: ICD-10-CM

## 2024-04-06 RX ORDER — NIFEDIPINE 30 MG/1
TABLET, EXTENDED RELEASE ORAL
Qty: 90 TABLET | Refills: 0 | Status: SHIPPED | OUTPATIENT
Start: 2024-04-06 | End: 2024-05-06

## 2024-04-06 RX ORDER — NIFEDIPINE 60 MG/1
TABLET, EXTENDED RELEASE ORAL
Qty: 90 TABLET | Refills: 1 | Status: SHIPPED | OUTPATIENT
Start: 2024-04-06 | End: 2024-04-25

## 2024-04-11 ENCOUNTER — HOSPITAL ENCOUNTER (OUTPATIENT)
Dept: LAB | Facility: MEDICAL CENTER | Age: 39
End: 2024-04-11
Attending: NURSE PRACTITIONER
Payer: MEDICAID

## 2024-04-11 LAB
EST. AVERAGE GLUCOSE BLD GHB EST-MCNC: 105 MG/DL
HBA1C MFR BLD: 5.3 % (ref 4–5.6)
T3FREE SERPL-MCNC: 3.03 PG/ML (ref 2–4.4)
T4 FREE SERPL-MCNC: 1.02 NG/DL (ref 0.93–1.7)
TSH SERPL DL<=0.005 MIU/L-ACNC: 2.34 UIU/ML (ref 0.38–5.33)

## 2024-04-11 PROCEDURE — 36415 COLL VENOUS BLD VENIPUNCTURE: CPT

## 2024-04-11 PROCEDURE — 84439 ASSAY OF FREE THYROXINE: CPT

## 2024-04-11 PROCEDURE — 84443 ASSAY THYROID STIM HORMONE: CPT

## 2024-04-11 PROCEDURE — 83036 HEMOGLOBIN GLYCOSYLATED A1C: CPT

## 2024-04-11 PROCEDURE — 84481 FREE ASSAY (FT-3): CPT

## 2024-04-25 ENCOUNTER — GYNECOLOGY VISIT (OUTPATIENT)
Dept: OBGYN | Facility: CLINIC | Age: 39
End: 2024-04-25
Payer: MEDICAID

## 2024-04-25 VITALS — DIASTOLIC BLOOD PRESSURE: 82 MMHG | SYSTOLIC BLOOD PRESSURE: 130 MMHG | WEIGHT: 164 LBS | BODY MASS INDEX: 30 KG/M2

## 2024-04-25 DIAGNOSIS — Z97.5 NEXPLANON IN PLACE: ICD-10-CM

## 2024-04-25 ASSESSMENT — FIBROSIS 4 INDEX: FIB4 SCORE: 2.05

## 2024-04-25 NOTE — PROGRESS NOTES
Pt is here for nexplanon follow up. Had it placed on 3/14/24 by myself and since then has been spotting on and off. She has also had some headaches and nausea sometimes, but thinks this might be related to her nifedipine. She does report some anxiety and depression (never diagnosed) before getting the implant but feels like this has been slightly worsened by the implant. Denies any SI/HI. She is going to see her PCP this afternoon to discuss her thyroid and BP meds and whether her symptoms are more related to her medications than the implant. She is not sure if she wants to remove the implant yet. Her partner is planning a vasectomy and has the appt scheduled.       Implant palpated in correct location of left arm      #Pt will see PCP this afternoon and then call to schedule with us is desires nexplanon to be removed  #Reviewed normal changes with bleeding but mood changes are more concerning and may need to be removed due to this  #Reviewed that after a vasectomy it take times for infertility and she was unaware of this, would need to use a bridge method in interim   #Counseling resources provided and pt encouraged to establish at [facility] for this  #RTC for nexplanon removal if desired

## 2024-04-25 NOTE — PROGRESS NOTES
Patient here for GYN visit. / Nexplanon Check   Last seen on : 3/14/24   BCM : Nexplanon placed - 3/14/24   Pt states since the placement she bled for 15 days similar to a period flow and after that she has been spotting everyday.   Pt believes she may have PP depression.   Phone/Pharmacy verified       ID : [id number]

## 2024-05-17 RX ORDER — LEVOTHYROXINE SODIUM 0.05 MG/1
50 TABLET ORAL
Qty: 30 TABLET | Refills: 0 | Status: SHIPPED | OUTPATIENT
Start: 2024-05-17

## 2024-05-17 NOTE — TELEPHONE ENCOUNTER
Pt was advised by JULIO Gar on gyn appt on 4/25/2024 to f/u with PCP to manage hypothyroidism. We will give her one month supply to bridge her making and appt.

## 2024-08-20 ENCOUNTER — TELEPHONE (OUTPATIENT)
Dept: OBGYN | Facility: CLINIC | Age: 39
End: 2024-08-20
Payer: MEDICAID

## 2024-08-20 NOTE — TELEPHONE ENCOUNTER
Pt called to get help with renewal for medicaid, I notified pt we can give her the number so she can call to them  and the can help since we can't.   Gave pt there number

## (undated) DEVICE — SUTURE 2-0 CHROMIC GUT CT-1 27 (36PK/BX)"

## (undated) DEVICE — LACTATED RINGERS INJ 1000 ML - (14EA/CA 60CA/PF)

## (undated) DEVICE — PACK ROOM TURNOVER L&D (12/CA)

## (undated) DEVICE — BLANKET UNDERBODY FULL ACCES - (5/CA)

## (undated) DEVICE — HEAD HOLDER JUNIOR/ADULT

## (undated) DEVICE — SUTURE 3-0 VICRYL PLUS CT-1 - 36 INCH (36/BX)

## (undated) DEVICE — DRESSING INTERCEED ABSORBABLE ADHESION BARRIER TC7 (10EA/CA)

## (undated) DEVICE — TAPE CLOTH MEDIPORE 6 INCH - (12RL/CA)

## (undated) DEVICE — SLEEVE, SEQUENTIAL CALF REG

## (undated) DEVICE — STAPLER SKIN DISP - (6/BX 10BX/CA) VISISTAT

## (undated) DEVICE — PENCIL ELECTSURG 10FT HLSTR - WITH BLADE (50EA/CA)

## (undated) DEVICE — WATER IRRIGATION STERILE 1000ML (12EA/CA)

## (undated) DEVICE — SUTURE 0 VICRYL PLUS CT-1 - 36 INCH (36/BX)

## (undated) DEVICE — SET EXTENSION WITH 2 PORTS (48EA/CA) ***PART #2C8610 IS A SUBSTITUTE*****

## (undated) DEVICE — TRAY SPINAL ANESTHESIA NON-SAFETY (10/CA)

## (undated) DEVICE — KIT  I.V. START (100EA/CA)

## (undated) DEVICE — CANISTER SUCTION 3000ML MECHANICAL FILTER AUTO SHUTOFF MEDI-VAC NONSTERILE LF DISP  (40EA/CA)

## (undated) DEVICE — CATHETER IV NON-SAFETY 18 GA X 1 1/4 (50/BX 4BX/CA)

## (undated) DEVICE — SUTURE 0 VICRYL PLUS CT 36 (36PK/BX)"

## (undated) DEVICE — SODIUM CHL IRRIGATION 0.9% 1000ML (12EA/CA)

## (undated) DEVICE — PACK C-SECTION (2EA/CA)

## (undated) DEVICE — CHLORAPREP 26 ML APPLICATOR - ORANGE TINT(25/CA)

## (undated) DEVICE — TUBING CLEARLINK DUO-VENT - C-FLO (48EA/CA)

## (undated) DEVICE — SUTURE 1 CHROMIC CTX ETHICON - (36PK/BX)

## (undated) DEVICE — GLOVE BIOGEL SZ 8 SURGICAL PF LTX - (50PR/BX 4BX/CA)